# Patient Record
Sex: FEMALE | Race: WHITE | NOT HISPANIC OR LATINO | Employment: FULL TIME | URBAN - METROPOLITAN AREA
[De-identification: names, ages, dates, MRNs, and addresses within clinical notes are randomized per-mention and may not be internally consistent; named-entity substitution may affect disease eponyms.]

---

## 2017-01-03 ENCOUNTER — OFFICE VISIT (OUTPATIENT)
Dept: OBSTETRICS AND GYNECOLOGY | Facility: CLINIC | Age: 22
End: 2017-01-03

## 2017-01-03 VITALS
HEIGHT: 65 IN | DIASTOLIC BLOOD PRESSURE: 70 MMHG | SYSTOLIC BLOOD PRESSURE: 110 MMHG | WEIGHT: 184 LBS | BODY MASS INDEX: 30.66 KG/M2

## 2017-01-03 DIAGNOSIS — N76.0 BACTERIAL VAGINAL INFECTION: ICD-10-CM

## 2017-01-03 DIAGNOSIS — N89.8 VAGINAL DISCHARGE: Primary | ICD-10-CM

## 2017-01-03 DIAGNOSIS — B96.89 BACTERIAL VAGINAL INFECTION: ICD-10-CM

## 2017-01-03 DIAGNOSIS — B37.9 CANDIDIASIS: ICD-10-CM

## 2017-01-03 LAB — WET PREP GENITAL: NORMAL

## 2017-01-03 PROCEDURE — 99213 OFFICE O/P EST LOW 20 MIN: CPT | Performed by: OBSTETRICS & GYNECOLOGY

## 2017-01-03 PROCEDURE — 87210 SMEAR WET MOUNT SALINE/INK: CPT | Performed by: OBSTETRICS & GYNECOLOGY

## 2017-01-03 RX ORDER — METRONIDAZOLE 500 MG/1
500 TABLET ORAL 2 TIMES DAILY
Qty: 14 TABLET | Refills: 1 | OUTPATIENT
Start: 2017-01-03 | End: 2020-01-17

## 2017-01-03 RX ORDER — NORGESTIMATE AND ETHINYL ESTRADIOL 7DAYSX3 28
KIT ORAL
COMMUNITY
Start: 2016-12-07 | End: 2017-01-08 | Stop reason: SDUPTHER

## 2017-01-03 NOTE — PROGRESS NOTES
Subjective   Kelly Stevens is a 21 y.o. female.     History of Present Illness    Patient is having pain. Pt had wet mount in Dec , negative. UA in Dec was negative. No improvement in her symptoms  The following portions of the patient's history were reviewed and updated as appropriate: allergies, current medications, past family history, past medical history, past social history, past surgical history and problem list.    Review of Systems   Constitutional: Negative.    Genitourinary: Positive for dyspareunia, vaginal discharge and vaginal pain. Negative for decreased urine volume, difficulty urinating, dysuria, enuresis, flank pain, frequency, genital sores, hematuria, menstrual problem, pelvic pain, urgency and vaginal bleeding.   Psychiatric/Behavioral: Negative.        Objective   Physical Exam   Genitourinary: Pelvic exam was performed with patient supine. No labial fusion. There is rash on the right labia. There is no tenderness or lesion on the right labia. There is rash on the left labia. There is no tenderness, lesion or injury on the left labia. Uterus is not deviated, not enlarged, not fixed and not tender. Cervix exhibits no motion tenderness, no discharge and no friability. Right adnexum displays no mass, no tenderness and no fullness. Left adnexum displays no mass, no tenderness and no fullness. No erythema, tenderness or bleeding in the vagina. No foreign body in the vagina. No signs of injury around the vagina. Vaginal discharge found.           Assessment/Plan   Kelly was seen today for follow-up.    Diagnoses and all orders for this visit:    Vaginal discharge  -     POC Wet Prep    Candidiasis  -     terconazole (TERAZOL 7) 0.4 % vaginal cream; Insert 1 applicator into the vagina Every Night.    Bacterial vaginal infection  -     metroNIDAZOLE (FLAGYL) 500 MG tablet; Take 1 tablet by mouth 2 (Two) Times a Day.            Return 3 weeks    Kenroy Gustafson MD

## 2017-01-03 NOTE — MR AVS SNAPSHOT
Kelly Jacobsjasmin   1/3/2017 8:50 AM   Office Visit    Dept Phone:  [phone]   Encounter #:  [id number]    Provider:  Kenroy Gustafson MD   Department:  [facility]OLOG[facility]                Your Full Care Plan              Today's Medication Changes          These changes are accurate as of: 1/3/17 10:17 AM.  If you have any questions, ask your nurse or doctor.               New Medication(s)Ordered:     metroNIDAZOLE 500 MG tablet   Commonly known as:  FLAGYL   Take 1 tablet by mouth 2 (Two) Times a Day.       terconazole 0.4 % vaginal cream   Commonly known as:  TERAZOL 7   Insert 1 applicator into the vagina Every Night.            Where to Get Your Medications      These medications were sent to [facility] [address] [place] [address] - [address] [facility] [address] [facility] [address] - [phone]  - [phone]   [address] [facility] [address]     Phone:  [phone]     metroNIDAZOLE 500 MG tablet    terconazole 0.4 % vaginal cream                  Your Updated Medication List          This list is accurate as of: 1/3/17 10:17 AM.  Always use your most recent med list.                cetirizine 10 MG tablet   Commonly known as:  zyrTEC       hydrOXYzine 50 MG tablet   Commonly known as:  ATARAX       metroNIDAZOLE 500 MG tablet   Commonly known as:  FLAGYL   Take 1 tablet by mouth 2 (Two) Times a Day.       mometasone-formoterol 200-5 MCG/ACT inhaler   Commonly known as:  DULERA 200       montelukast 10 MG tablet   Commonly known as:  SINGULAIR       * ORTHO TRI-CYCLEN (28) PO       * TRI-SPRINTEC 0.18/0.215/0.25 MG-35 MCG per tablet   Generic drug:  norgestimate-ethinyl estradiol       terconazole 0.4 % vaginal cream   Commonly known as:  TERAZOL 7   Insert 1 applicator into the vagina Every Night.       * Notice:  This list has 2 medication(s) that are the same as other medications prescribed for you. Read the directions  "carefully, and ask your doctor or other care provider to review them with you.            We Performed the Following     POC Wet Prep       You Were Diagnosed With        Codes Comments    Vaginal discharge    -  Primary ICD-10-CM: N89.8  ICD-9-CM: 623.5     Candidiasis     ICD-10-CM: B37.9  ICD-9-CM: 112.9     Bacterial vaginal infection     ICD-10-CM: N76.0, B96.89  ICD-9-CM: 616.10, 041.9       Instructions     None    Patient Instructions History      Upcoming Appointments     Visit Type Date Time Department    GYN FOLLOW UP 1/3/2017  8:50 AM [facility] [place]      [employer] Signup     [facility] [employer] allows you to send messages to your doctor, view your test results, renew your prescriptions, schedule appointments, and more. To sign up, go to [employer] and click on the Sign Up Now link in the New User? box. Enter your [employer] Activation Code exactly as it appears below along with the last four digits of your Social Security Number and your Date of Birth () to complete the sign-up process. If you do not sign up before the expiration date, you must request a new code.    [employer] Activation Code: [email or web address]  Expires: 2017 10:17 AM    If you have questions, you can email [email or web address] or call [phone] to talk to our [employer] staff. Remember, [employer] is NOT to be used for urgent needs. For medical emergencies, dial 911.               Other Info from Your Visit           Allergies     Sulfa Antibiotics        Reason for Visit     Follow-up Pt, is here for pelvic pain.  Pt also has vaginal burning.  She states it's been going on for about 2 months.       Vital Signs     Blood Pressure Height Weight Last Menstrual Period Body Mass Index Smoking Status    110/70 (BP Location: Right arm, Patient Position: Sitting, Cuff Size: Adult) 65\" (165.1 cm) 184 lb (83.5 kg) 2016 (Exact Date) 30.62 kg/m2 Never Smoker      Problems and Diagnoses Noted     Discharge from " the vagina    -  Primary    Candida infection        Bacterial vaginal infection          Results     POC Wet Prep      Component    Wet Prep    yeast and clue cells

## 2017-01-09 RX ORDER — NORGESTIMATE AND ETHINYL ESTRADIOL 7DAYSX3 28
KIT ORAL
Qty: 28 TABLET | Refills: 11 | OUTPATIENT
Start: 2017-01-09 | End: 2020-01-17

## 2017-03-02 ENCOUNTER — OFFICE VISIT (OUTPATIENT)
Dept: OBSTETRICS AND GYNECOLOGY | Facility: CLINIC | Age: 22
End: 2017-03-02

## 2017-03-02 VITALS — DIASTOLIC BLOOD PRESSURE: 84 MMHG | SYSTOLIC BLOOD PRESSURE: 128 MMHG | WEIGHT: 176 LBS | BODY MASS INDEX: 29.29 KG/M2

## 2017-03-02 DIAGNOSIS — N92.1 BREAKTHROUGH BLEEDING ON BIRTH CONTROL PILLS: Primary | ICD-10-CM

## 2017-03-02 PROCEDURE — 99213 OFFICE O/P EST LOW 20 MIN: CPT | Performed by: OBSTETRICS & GYNECOLOGY

## 2017-03-02 RX ORDER — BACLOFEN 10 MG/1
TABLET ORAL
COMMUNITY
Start: 2017-02-21 | End: 2020-01-17

## 2017-03-02 RX ORDER — CHLORHEXIDINE GLUCONATE 0.12 MG/ML
RINSE ORAL
Refills: 1 | COMMUNITY
Start: 2017-02-17 | End: 2020-01-17

## 2017-03-02 RX ORDER — HYDROXYZINE HYDROCHLORIDE 25 MG/1
TABLET, FILM COATED ORAL
COMMUNITY
Start: 2017-02-05 | End: 2021-05-17

## 2017-03-02 RX ORDER — CETIRIZINE HYDROCHLORIDE 10 MG/1
10 TABLET ORAL
COMMUNITY
End: 2020-01-17

## 2017-03-02 RX ORDER — MONTELUKAST SODIUM 10 MG/1
10 TABLET ORAL
COMMUNITY
End: 2020-01-17

## 2017-03-02 RX ORDER — GLYCOPYRROLATE 2 MG/1
TABLET ORAL
COMMUNITY
Start: 2017-02-27 | End: 2020-01-17

## 2017-03-02 RX ORDER — BUPROPION HYDROCHLORIDE 150 MG/1
TABLET ORAL
COMMUNITY
Start: 2017-02-21 | End: 2020-01-17

## 2017-03-02 NOTE — PROGRESS NOTES
Subjective   Kelly Stevens is a 21 y.o. female.     History of Present Illness    Break through bleeding on BCP. Pt has been on this pill for 15 months  The following portions of the patient's history were reviewed and updated as appropriate: allergies, current medications, past family history, past medical history, past social history, past surgical history and problem list.    Review of Systems   Constitutional: Negative.    Respiratory: Negative.    Cardiovascular: Negative.    Gastrointestinal: Negative.    Genitourinary: Positive for menstrual problem, vaginal bleeding and vaginal discharge. Negative for decreased urine volume, difficulty urinating, dyspareunia, dysuria, enuresis, flank pain, frequency, genital sores, hematuria, pelvic pain, urgency and vaginal pain.   Psychiatric/Behavioral: Negative.        Objective   Physical Exam   Constitutional: She appears well-nourished.   Abdominal: Soft. Bowel sounds are normal. She exhibits no distension and no mass. There is no tenderness. There is no rebound and no guarding. No hernia.   Genitourinary: Pelvic exam was performed with patient supine. No labial fusion. There is no rash, tenderness, lesion or injury on the right labia. There is no rash, tenderness, lesion or injury on the left labia. Uterus is not deviated, not enlarged, not fixed and not tender. Cervix exhibits discharge. Cervix exhibits no motion tenderness and no friability. Right adnexum displays no mass, no tenderness and no fullness. Left adnexum displays no mass, no tenderness and no fullness. There is bleeding in the vagina. No erythema or tenderness in the vagina. No foreign body in the vagina. No signs of injury around the vagina. No vaginal discharge found.   Nursing note and vitals reviewed.      Assessment/Plan   Kelly was seen today for gynecologic exam.    Diagnoses and all orders for this visit:    Breakthrough bleeding on birth control pills    Other orders  -      norgestrel-ethinyl estradiol (LOW-OGESTREL,CRYSELLE) 0.3-30 MG-MCG per tablet; Take 1 tablet by mouth Daily.           Change to monophasic BCP  Return 3 months    Kenroy Gustafson MD

## 2018-07-09 RX ORDER — NORGESTREL AND ETHINYL ESTRADIOL 0.3-0.03MG
KIT ORAL
Qty: 28 TABLET | Refills: 0 | OUTPATIENT
Start: 2018-07-09 | End: 2020-01-17

## 2021-05-17 ENCOUNTER — OFFICE VISIT (OUTPATIENT)
Dept: OBSTETRICS AND GYNECOLOGY | Facility: CLINIC | Age: 26
End: 2021-05-17

## 2021-05-17 VITALS
HEIGHT: 63 IN | DIASTOLIC BLOOD PRESSURE: 80 MMHG | RESPIRATION RATE: 14 BRPM | WEIGHT: 203.4 LBS | SYSTOLIC BLOOD PRESSURE: 106 MMHG | BODY MASS INDEX: 36.04 KG/M2

## 2021-05-17 DIAGNOSIS — Z01.419 WELL WOMAN EXAM WITH ROUTINE GYNECOLOGICAL EXAM: Primary | ICD-10-CM

## 2021-05-17 DIAGNOSIS — N94.6 DYSMENORRHEA: ICD-10-CM

## 2021-05-17 DIAGNOSIS — Z86.19 HISTORY OF MONONUCLEOSIS: ICD-10-CM

## 2021-05-17 PROCEDURE — 99385 PREV VISIT NEW AGE 18-39: CPT | Performed by: OBSTETRICS & GYNECOLOGY

## 2021-05-17 NOTE — PROGRESS NOTES
Subjective   Chief Complaint   Patient presents with   • Establish Care     Former patient.    • Menstrual Problem     Patient complains of painful periods     Kelly Stevens is a 25 y.o. year old  presenting to be seen for her annual exam.  Patient presents today for an annual checkup.  She is having painful periods and wants to resume birth control pills which did improve her cycles.  Patient is not sexually active at the present time.  In the last 3 months or so the patient developed mono.  She has had some neurological symptoms of confusion and dizziness.  Her primary care has referred her to neurology for work-up.  Patient is in the process of obtaining a neurologist.  She will complete her evaluation and call to resume birth control pills when she is cleared.  Patient took Nordette in 2017 and did well on this birth control pill  Adult Visits - 19 to 39 Years - Counseling/Anticipatory Guidance: Nutrition, family planning/contraception, physical activity, healthy weight, injury prevention, misuse of tobacco, alcohol and drugs, sexual behavior and STDs, dental health, mental health, immunizations, screenings For Women: Breast cancer and self breast exams    SEXUAL Hx:  She is not currently sexually active.  In the past year there has not been new sexual partners.    Condoms are not typically used.  She would not like to be screened for STD's at today's exam.  Current birth control method: abstinence.  She is happy with her current method of contraception and does not want to discuss alternative methods of contraception.  MENSTRUAL Hx:  Patient's last menstrual period was 05/10/2021 (approximate).  In the past 6 months her cycles have been regular, predictable and occur monthly.  Her menstrual flow is typically normal.   Each month on average there are roughly 2 day(s) of very heavy flow.    Intermenstrual bleeding is absent.    Post-coital bleeding is absent.  Dysmenorrhea: severe  PMS: is not  "affecting her activities of daily living  Her cycles ARE a source of concern for her that she wishes to discuss today.  HEALTH Hx:  She exercises regularly:yes.  She wears her seat belt:yes.  She has concerns about domestic violence: no.  OTHER COMPLAINTS:  Nothing else    The following portions of the patient's history were reviewed and updated as appropriate:problem list, current medications, allergies, past family history, past medical history, past social history and past surgical history.    Social History    Tobacco Use      Smoking status: Never Smoker      Smokeless tobacco: Never Used    Review of Systems  Review of Systems - History obtained from the patient  General ROS: negative  Psychological ROS: negative  ENT ROS: negative  Allergy and Immunology ROS: negative  Hematological and Lymphatic ROS: negative  Endocrine ROS: negative  Breast ROS: negative for breast lumps  Respiratory ROS: no cough, shortness of breath, or wheezing  Cardiovascular ROS: no chest pain or dyspnea on exertion  Gastrointestinal ROS: no abdominal pain, change in bowel habits, or black or bloody stools  Genito-Urinary ROS: no dysuria, trouble voiding, or hematuria  Musculoskeletal ROS: negative  Neurological ROS: positive for - confusion and dizziness  Dermatological ROS: negative        Objective   /80 (BP Location: Right arm, Patient Position: Sitting, Cuff Size: Adult)   Resp 14   Ht 160 cm (63\")   Wt 92.3 kg (203 lb 6.4 oz)   LMP 05/10/2021 (Approximate)   Breastfeeding No   BMI 36.03 kg/m²     General:  well developed; well nourished  no acute distress   Skin:  No suspicious lesions seen   Thyroid: not examined   Breasts:  Examined in supine position  Symmetric without masses or skin dimpling  Nipples normal without inversion, lesions or discharge  There are no palpable axillary nodes   Abdomen: soft, non-tender; no masses  no umbilical or inguinal hernias are present  no hepato-splenomegaly   Heart: regular rate " and rhythm, S1, S2 normal, no murmur, click, rub or gallop   Lungs: clear to auscultation   Pelvis: Clinical staff was present for exam  External genitalia:  normal appearance of the external genitalia including Bartholin's and [place]'s glands.  :  urethral meatus normal;  Vaginal:  normal pink mucosa without prolapse or lesions.  Cervix:  normal appearance. Pap smear was done  Uterus:  normal size, shape and consistency. anteverted;  Adnexa:  normal bimanual exam of the adnexa.  Rectal:  digital rectal exam not performed; anus visually normal appearing.          Assessment/Plan   Diagnoses and all orders for this visit:    1. Well woman exam with routine gynecological exam (Primary)  -     Pap IG, Rfx HPV ASCU; Future    2. Dysmenorrhea    3. History of mononucleosis         Call and a prescription for Nordette will be sent to her pharmacy  Return in 1 year  This note was electronically signed.    Kenroy Gustafson MD   May 17, 2021

## 2021-05-19 DIAGNOSIS — Z01.419 WELL WOMAN EXAM WITH ROUTINE GYNECOLOGICAL EXAM: ICD-10-CM

## 2023-05-18 ENCOUNTER — TELEPHONE (OUTPATIENT)
Dept: FAMILY MEDICINE CLINIC | Facility: CLINIC | Age: 28
End: 2023-05-18

## 2023-05-18 NOTE — TELEPHONE ENCOUNTER
Left message I believe pt has wrong office, she is seeing Dr. Benitez.  We did not refer her to neurology we have not see this pt since we went [facility]

## 2023-05-18 NOTE — TELEPHONE ENCOUNTER
Caller: Kelly Stevens    Relationship: Self    Best call back number: [phone]    What specialty or service is being requested: NEUROLOGY     What is the provider, practice or medical service name: DR. LUNDBERG     What is the office location: [place]     What is the office phone number: [phone]    Any additional details: PATIENT HAS PREVIOUSLY BEEN REFERRED TO NEUROLOGY BUT IS ASKING TO GET REFERRED TO THIS NEUROLOGIST

## 2023-08-04 ENCOUNTER — INITIAL PRENATAL (OUTPATIENT)
Dept: OBSTETRICS AND GYNECOLOGY | Facility: CLINIC | Age: 28
End: 2023-08-04
Payer: MEDICAID

## 2023-08-04 VITALS — WEIGHT: 142.8 LBS | BODY MASS INDEX: 25.3 KG/M2 | DIASTOLIC BLOOD PRESSURE: 64 MMHG | SYSTOLIC BLOOD PRESSURE: 116 MMHG

## 2023-08-04 DIAGNOSIS — Z34.91 INITIAL OBSTETRIC VISIT IN FIRST TRIMESTER: Primary | ICD-10-CM

## 2023-08-04 LAB
B-HCG UR QL: POSITIVE
EXPIRATION DATE: ABNORMAL
GLUCOSE UR STRIP-MCNC: NEGATIVE MG/DL
INTERNAL NEGATIVE CONTROL: ABNORMAL
INTERNAL POSITIVE CONTROL: ABNORMAL
Lab: ABNORMAL
PROT UR STRIP-MCNC: NEGATIVE MG/DL

## 2023-08-04 RX ORDER — BUDESONIDE AND FORMOTEROL FUMARATE DIHYDRATE 80; 4.5 UG/1; UG/1
2 AEROSOL RESPIRATORY (INHALATION)
COMMUNITY

## 2023-08-05 LAB
ABO GROUP BLD: ABNORMAL
AMPHETAMINES UR QL SCN: NEGATIVE NG/ML
BARBITURATES UR QL SCN: NEGATIVE NG/ML
BASOPHILS # BLD AUTO: 0.1 X10E3/UL (ref 0–0.2)
BASOPHILS NFR BLD AUTO: 1 %
BENZODIAZ UR QL: NEGATIVE NG/ML
BLD GP AB SCN SERPL QL: NEGATIVE
BZE UR QL: NEGATIVE NG/ML
CANNABINOIDS UR QL SCN: NEGATIVE NG/ML
EOSINOPHIL # BLD AUTO: 0.6 X10E3/UL (ref 0–0.4)
EOSINOPHIL NFR BLD AUTO: 8 %
ERYTHROCYTE [DISTWIDTH] IN BLOOD BY AUTOMATED COUNT: 13 % (ref 11.7–15.4)
HBV SURFACE AG SERPL QL IA: NEGATIVE
HCT VFR BLD AUTO: 37 % (ref 34–46.6)
HCV IGG SERPL QL IA: NON REACTIVE
HGB BLD-MCNC: 13.1 G/DL (ref 11.1–15.9)
HIV 1+2 AB+HIV1 P24 AG SERPL QL IA: NON REACTIVE
IMM GRANULOCYTES # BLD AUTO: 0 X10E3/UL (ref 0–0.1)
IMM GRANULOCYTES NFR BLD AUTO: 0 %
LYMPHOCYTES # BLD AUTO: 1.3 X10E3/UL (ref 0.7–3.1)
LYMPHOCYTES NFR BLD AUTO: 17 %
MCH RBC QN AUTO: 29.3 PG (ref 26.6–33)
MCHC RBC AUTO-ENTMCNC: 35.4 G/DL (ref 31.5–35.7)
MCV RBC AUTO: 83 FL (ref 79–97)
METHADONE UR QL SCN: NEGATIVE NG/ML
MONOCYTES # BLD AUTO: 0.4 X10E3/UL (ref 0.1–0.9)
MONOCYTES NFR BLD AUTO: 5 %
NEUTROPHILS # BLD AUTO: 5.4 X10E3/UL (ref 1.4–7)
NEUTROPHILS NFR BLD AUTO: 69 %
OPIATES UR QL: NEGATIVE NG/ML
PCP UR QL: NEGATIVE NG/ML
PLATELET # BLD AUTO: 283 X10E3/UL (ref 150–450)
PROPOXYPH UR QL SCN: NEGATIVE NG/ML
RBC # BLD AUTO: 4.47 X10E6/UL (ref 3.77–5.28)
RH BLD: POSITIVE
RPR SER QL: NON REACTIVE
RUBV IGG SERPL IA-ACNC: 4.61 INDEX
WBC # BLD AUTO: 7.8 X10E3/UL (ref 3.4–10.8)

## 2023-08-06 LAB
BACTERIA UR CULT: NORMAL
BACTERIA UR CULT: NORMAL

## 2023-08-09 ENCOUNTER — TELEPHONE (OUTPATIENT)
Dept: OBSTETRICS AND GYNECOLOGY | Facility: CLINIC | Age: 28
End: 2023-08-09
Payer: MEDICAID

## 2023-08-09 LAB
C TRACH RRNA CVX QL NAA+PROBE: NEGATIVE
CYTOLOGIST CVX/VAG CYTO: ABNORMAL
CYTOLOGY CVX/VAG DOC CYTO: ABNORMAL
CYTOLOGY CVX/VAG DOC THIN PREP: ABNORMAL
DX ICD CODE: ABNORMAL
HIV 1 & 2 AB SER-IMP: ABNORMAL
HPV I/H RISK 4 DNA CVX QL PROBE+SIG AMP: POSITIVE
N GONORRHOEA RRNA CVX QL NAA+PROBE: NEGATIVE
OTHER STN SPEC: ABNORMAL
STAT OF ADQ CVX/VAG CYTO-IMP: ABNORMAL
T VAGINALIS RRNA SPEC QL NAA+PROBE: NEGATIVE

## 2023-08-09 NOTE — TELEPHONE ENCOUNTER
Pt called stating she had a missed call. It looks like you tried to contact her today and left a vm for her to call back.I placed pt on hold and went to go find you but when I returned pt had hung up.     Keri Molina

## 2023-08-09 NOTE — TELEPHONE ENCOUNTER
----- Message from Kenroy Aguilar MD sent at 8/9/2023 11:33 AM EDT -----  Please let the patient know that her Pap is normal, but HPV testing was positive.  This will not affect the pregnancy or the baby.  I recommend a repeat Pap 6 weeks postpartum.  Thank you.

## 2023-08-10 ENCOUNTER — TELEPHONE (OUTPATIENT)
Dept: OBSTETRICS AND GYNECOLOGY | Facility: CLINIC | Age: 28
End: 2023-08-10
Payer: MEDICAID

## 2023-09-08 ENCOUNTER — ROUTINE PRENATAL (OUTPATIENT)
Dept: OBSTETRICS AND GYNECOLOGY | Facility: CLINIC | Age: 28
End: 2023-09-08
Payer: MEDICAID

## 2023-09-08 VITALS — BODY MASS INDEX: 26.89 KG/M2 | SYSTOLIC BLOOD PRESSURE: 124 MMHG | DIASTOLIC BLOOD PRESSURE: 70 MMHG | WEIGHT: 151.8 LBS

## 2023-09-08 DIAGNOSIS — Z34.82 PRENATAL CARE, SUBSEQUENT PREGNANCY IN SECOND TRIMESTER: Primary | ICD-10-CM

## 2023-09-08 LAB
GLUCOSE UR STRIP-MCNC: NEGATIVE MG/DL
PROT UR STRIP-MCNC: NEGATIVE MG/DL

## 2023-09-08 RX ORDER — PRENATAL VIT/IRON FUM/FOLIC AC 27MG-0.8MG
1 TABLET ORAL DAILY
COMMUNITY

## 2023-09-08 NOTE — PROGRESS NOTES
CC: Obstetric visit    HPI: 27-year-old  2 para 1 at 15-0/7 weeks by first trimester ultrasound presents for her obstetric visit.  Her nausea has resolved.  Overall, she is feeling well.  She has mild fatigue.    Review of systems    This is negative for nausea and vomiting.  Negative for abdominal or pelvic pain.  Negative for vaginal bleeding.    Physical examination    The abdomen is soft and gravid.  There is no epigastric tenderness.  No fundal tenderness.  No suprapubic tenderness.  Extremities are equal in size    Assessment and plan    1.  Intrauterine pregnancy at 15-0/7 weeks by first trimester ultrasound.  Counseled regarding the patient's due date.  She reports that her last menstrual cycle was approximate.  For this reason, we will use the ultrasound for gestational dating.  The patient agrees with this recommendation.  2.  Counseled regarding genetic screening.  The patient would like to proceed with noninvasive prenatal screening today.  This has been ordered.

## 2023-09-10 LAB
AFP INTERP SERPL-IMP: NORMAL
AFP INTERP SERPL-IMP: NORMAL
AFP MOM SERPL: 0.88
AFP SERPL-MCNC: 26.5 NG/ML
AGE AT DELIVERY: 29.1 YR
GA METHOD: NORMAL
GA: 15 WEEKS
IDDM PATIENT QL: NO
LABORATORY COMMENT REPORT: NORMAL
MULTIPLE PREGNANCY: NO
NEURAL TUBE DEFECT RISK FETUS: NORMAL %
RESULT: NORMAL

## 2023-09-14 LAB
CFTR MUT ANL BLD/T: NORMAL
LABORATORY COMMENT REPORT: NORMAL

## 2023-09-16 LAB
CFDNA.FET/CFDNA.TOTAL SFR FETUS: NORMAL %
CITATION REF LAB TEST: NORMAL
FET 13+18+21+X+Y ANEUP PLAS.CFDNA: NEGATIVE
FET CHR 21 TS PLAS.CFDNA QL: NEGATIVE
FET SEX PLAS.CFDNA DOSAGE CFDNA: NORMAL
FET TS 13 RISK PLAS.CFDNA QL: NEGATIVE
FET TS 18 RISK WBC.DNA+CFDNA QL: NEGATIVE
GA EST FROM CONCEPTION DATE: NORMAL D
GESTATIONAL AGE > 9:: YES
LAB DIRECTOR NAME PROVIDER: NORMAL
LAB DIRECTOR NAME PROVIDER: NORMAL
LABORATORY COMMENT REPORT: NORMAL
LIMITATIONS OF THE TEST: NORMAL
NEGATIVE PREDICTIVE VALUE: NORMAL
NOTE: NORMAL
PERFORMANCE CHARACTERISTICS: NORMAL
POSITIVE PREDICTIVE VALUE: NORMAL
REF LAB TEST METHOD: NORMAL
TEST PERFORMANCE INFO SPEC: NORMAL

## 2023-10-06 ENCOUNTER — ROUTINE PRENATAL (OUTPATIENT)
Dept: OBSTETRICS AND GYNECOLOGY | Facility: CLINIC | Age: 28
End: 2023-10-06
Payer: MEDICAID

## 2023-10-06 VITALS — BODY MASS INDEX: 27.67 KG/M2 | DIASTOLIC BLOOD PRESSURE: 64 MMHG | WEIGHT: 156.2 LBS | SYSTOLIC BLOOD PRESSURE: 116 MMHG

## 2023-10-06 DIAGNOSIS — Z3A.19 19 WEEKS GESTATION OF PREGNANCY: Primary | ICD-10-CM

## 2023-10-06 NOTE — PROGRESS NOTES
CC: Obstetric visit    HPI: 27-year-old  2 para 1 presents at 19-0/7 weeks for her obstetric visit.  She has begun to appreciate fetal movement.  She has no complaints today.    Review of systems    This is negative for nausea or vomiting.  Negative for fever or chills.  Negative for vaginal bleeding.    Physical examination    The abdomen is soft and gravid.  There is no epigastric tenderness.  No fundal tenderness.  No suprapubic tenderness.  Extremities are equal in size    Assessment and plan    1.  Intrauterine pregnancy at 19-0/7 weeks  2.  AFP testing was negative.  Counseled.  3.  Screening ultrasound in the next several weeks.

## 2023-11-03 ENCOUNTER — ROUTINE PRENATAL (OUTPATIENT)
Dept: OBSTETRICS AND GYNECOLOGY | Facility: CLINIC | Age: 28
End: 2023-11-03
Payer: MEDICAID

## 2023-11-03 VITALS — SYSTOLIC BLOOD PRESSURE: 118 MMHG | DIASTOLIC BLOOD PRESSURE: 68 MMHG | WEIGHT: 165.4 LBS | BODY MASS INDEX: 29.3 KG/M2

## 2023-11-03 DIAGNOSIS — Z3A.23 23 WEEKS GESTATION OF PREGNANCY: Primary | ICD-10-CM

## 2023-11-03 LAB
GLUCOSE UR STRIP-MCNC: NEGATIVE MG/DL
PROT UR STRIP-MCNC: NEGATIVE MG/DL

## 2023-11-03 NOTE — PROGRESS NOTES
CC: Obstetric visit    HPI: 27-year-old  2 para 1 presents at 23-0/7 weeks for her obstetric visit.  She had her screening ultrasound recently.  It was a normal screening.  Her baby is moving actively.  She had some vaginal discharge which she treated with Monistat.  The discharge is improved, but the patient describes a persistent vaginal ache.    Review of systems    This is positive for discharge, now resolved.  Positive for pain.  Negative for fever or chills.  Negative for nausea or vomiting.    Physical examination    The abdomen is soft and gravid.  There is no epigastric tenderness.  No fundal tenderness.  No suprapubic tenderness.  Extremities are equal in size  Pelvic examination reveals normal female external genitalia.  There is slight inflammation around the labia minora bilaterally.  Discharge appears physiologic.  Cultures performed.  The cervix is closed, thick and posterior.    Assessment and plan    1.  Intrauterine pregnancy at 23-0/7 weeks  2.  Screening ultrasound was reviewed and discussed with the patient.  3.  Counseled regarding ACOG recommendations for the influenza vaccine in pregnancy.  The patient would like to proceed, but not today.  She will make arrangements to do this on another day.  4.  Vaginal discharge with vaginal discomfort.  Counseled.  Physical examination is normal except for some slight.  Irritation of the labia minora.  Cultures performed.  Further recommendations pending the results of these cultures.

## 2023-11-07 ENCOUNTER — TELEPHONE (OUTPATIENT)
Dept: OBSTETRICS AND GYNECOLOGY | Facility: CLINIC | Age: 28
End: 2023-11-07
Payer: MEDICAID

## 2023-11-07 NOTE — TELEPHONE ENCOUNTER
If the patient is feeling shortness of breath or very high fevers, she should come to the emergency room, as hospital admission could be required.  If her symptoms are mostly upper respiratory symptoms, we recommend bed rest, Tylenol for fever control and plenty of fluids.  Paxlovid can be given, but is not given as often in pregnancy.

## 2023-11-07 NOTE — TELEPHONE ENCOUNTER
Provider: DR HERNANDEZ    Caller: JODY CUI    Relationship to Patient: SELF    [facility]    Phone Number: [phone]    Reason for Call: PT WENT TO URGENT CARE LAST NIGHT AND WAS DIAGNOSED WITH COVID URGENT CARE RECOMMENDED THAT SHE CONTACT HER OBGYN TO SEE HOW THEY WANTED TO TREAT HER DUE TO HER BEING PREGNANT -PLEASE CALL PT TO ADVISE

## 2023-11-07 NOTE — TELEPHONE ENCOUNTER
Spoke with Kelly and read Dr Aguilar's recommendations to her. Included antihistamines, Mucinex plain and Robitussin plain. If worsens, please let us know.

## 2023-11-15 ENCOUNTER — REFERRAL TRIAGE (OUTPATIENT)
Dept: LABOR AND DELIVERY | Facility: HOSPITAL | Age: 28
End: 2023-11-15
Payer: MEDICAID

## 2023-11-27 ENCOUNTER — PATIENT OUTREACH (OUTPATIENT)
Dept: LABOR AND DELIVERY | Facility: HOSPITAL | Age: 28
End: 2023-11-27
Payer: MEDICAID

## 2023-11-27 NOTE — OUTREACH NOTE
[facility]  Unable to Reach       Questions/Answers      Flowsheet Row Responses   Pending Outreach Confirm Patient Interest   Call Attempt First   Outcome Left message            Left vm, encouraged to reach out with any needs. Will call again at a later date.      Sarwat Franklin RN  Maternity Nurse Navigator    11/27/2023, 13:39 EST

## 2023-11-29 ENCOUNTER — PATIENT OUTREACH (OUTPATIENT)
Dept: LABOR AND DELIVERY | Facility: HOSPITAL | Age: 28
End: 2023-11-29
Payer: MEDICAID

## 2023-11-29 NOTE — OUTREACH NOTE
[facility]  Unable to Reach       Questions/Answers      Flowsheet Row Responses   Pending Outreach Confirm Patient Interest   Call Attempt Second   Outcome Left message            Declined program for pt d/t UTR: 2 voicemails and 1 mychart msg sent. Pt has my contact info and encouraged her to reach out with any needs.      Sarwat Franklin RN  Maternity Nurse Navigator    11/29/2023, 12:48 EST

## 2023-12-01 ENCOUNTER — ROUTINE PRENATAL (OUTPATIENT)
Dept: OBSTETRICS AND GYNECOLOGY | Facility: CLINIC | Age: 28
End: 2023-12-01
Payer: MEDICAID

## 2023-12-01 VITALS — BODY MASS INDEX: 29.9 KG/M2 | SYSTOLIC BLOOD PRESSURE: 110 MMHG | WEIGHT: 168.8 LBS | DIASTOLIC BLOOD PRESSURE: 64 MMHG

## 2023-12-01 DIAGNOSIS — Z34.82 PRENATAL CARE, SUBSEQUENT PREGNANCY IN SECOND TRIMESTER: Primary | ICD-10-CM

## 2023-12-01 DIAGNOSIS — Z3A.23 23 WEEKS GESTATION OF PREGNANCY: ICD-10-CM

## 2023-12-01 LAB
GLUCOSE UR STRIP-MCNC: NEGATIVE MG/DL
PROT UR STRIP-MCNC: NEGATIVE MG/DL

## 2023-12-01 NOTE — PROGRESS NOTES
CC: Obstetric visit    HPI: 27-year-old  2 para 1 presents at 27-0/7 weeks for her obstetric visit.  Her baby is moving actively.  She had COVID last month, but has recovered almost completely.  She is feeling well.    Review of systems    This is positive for cough.  It is negative for fever or chills.  Negative for nausea or vomiting.    Physical examination    The abdomen is soft and gravid.  There is no epigastric tenderness.  No fundal tenderness.  No suprapubic tenderness.  Extremities are equal in size    Assessment and plan    1.  Intrauterine pregnancy at 27-0/7 weeks  2.  1 hour glucose tolerance test today.  3.  Recent COVID.  The patient is recovering well.

## 2023-12-02 LAB
BLD GP AB SCN SERPL QL: NEGATIVE
GLUCOSE 1H P 50 G GLC PO SERPL-MCNC: 89 MG/DL (ref 65–139)
HCT VFR BLD AUTO: 34.5 % (ref 34–46.6)
HGB BLD-MCNC: 11.9 G/DL (ref 12–15.9)

## 2023-12-22 ENCOUNTER — ROUTINE PRENATAL (OUTPATIENT)
Dept: OBSTETRICS AND GYNECOLOGY | Facility: CLINIC | Age: 28
End: 2023-12-22
Payer: MEDICAID

## 2023-12-22 VITALS — BODY MASS INDEX: 30.19 KG/M2 | SYSTOLIC BLOOD PRESSURE: 114 MMHG | DIASTOLIC BLOOD PRESSURE: 62 MMHG | WEIGHT: 170.4 LBS

## 2023-12-22 DIAGNOSIS — Z3A.30 30 WEEKS GESTATION OF PREGNANCY: Primary | ICD-10-CM

## 2023-12-22 RX ORDER — FERROUS SULFATE 325(65) MG
325 TABLET ORAL
Qty: 30 TABLET | Refills: 11 | Status: SHIPPED | OUTPATIENT
Start: 2023-12-22

## 2023-12-22 NOTE — PROGRESS NOTES
CC: Obstetric visit    HPI: 27-year-old  2 para 1 presents at 30-0/7 weeks for her obstetric visit.  Her baby is moving actively.  She complains of some fatigue.    Review of systems    This is negative for fever or chills.  Negative for nausea or vomiting.  Negative for vaginal bleeding.    Physical examination    The abdomen is soft and gravid.  There is no epigastric tenderness.  No fundal tenderness.  No CVA tenderness.  No suprapubic tenderness.  Extremities are equal in size    Assessment and plan    1.  Intrauterine pregnancy at 30-0/7 weeks  2.  Counseled regarding American Academy of pediatrics recommendations for the RSV vaccine between 32 and 36 weeks.  3.  Mild anemia.  The patient complains of fatigue.  Counseled regarding oral iron and the patient would like to try this.  A prescription has been sent.

## 2024-01-05 ENCOUNTER — ROUTINE PRENATAL (OUTPATIENT)
Dept: OBSTETRICS AND GYNECOLOGY | Facility: CLINIC | Age: 29
End: 2024-01-05
Payer: MEDICAID

## 2024-01-05 VITALS — SYSTOLIC BLOOD PRESSURE: 120 MMHG | DIASTOLIC BLOOD PRESSURE: 70 MMHG | WEIGHT: 176 LBS | BODY MASS INDEX: 31.18 KG/M2

## 2024-01-05 DIAGNOSIS — Z34.83 PRENATAL CARE, SUBSEQUENT PREGNANCY, THIRD TRIMESTER: Primary | ICD-10-CM

## 2024-01-05 LAB
GLUCOSE UR STRIP-MCNC: NEGATIVE MG/DL
PROT UR STRIP-MCNC: NEGATIVE MG/DL

## 2024-01-05 NOTE — PROGRESS NOTES
CC: Obstetric visit    HPI: 28-year-old  2 para 1 presents at 32-0/7 weeks for her obstetric visit.  Her baby is moving actively.  She complains of some right-sided abdominal pain which is associated with fetal movement.    Review of systems    This is negative for nausea or vomiting.  Negative for vaginal bleeding.  Negative for fever or chills.    Physical examination    The abdomen is soft and gravid.  There is no epigastric tenderness.  No right upper quadrant tenderness.  No fundal tenderness.  No suprapubic tenderness.  Extremities are equal in size    Assessment and plan    1.  Intrauterine pregnancy at 32-0/7 weeks  2.  Counseled regarding American Academy of pediatrics recommendation for the RSV vaccine between 32 and 36 weeks.  The patient plans to proceed over the next several days.  3.  The patient reports abdominal pain in the right side of the uterus associated with fetal movement.  She has no vaginal bleeding.  I am not able to reproduce the symptoms on physical examination today.  I recommend an ultrasound to assess the placenta and the patient agrees with this.

## 2024-01-19 ENCOUNTER — ROUTINE PRENATAL (OUTPATIENT)
Dept: OBSTETRICS AND GYNECOLOGY | Facility: CLINIC | Age: 29
End: 2024-01-19
Payer: MEDICAID

## 2024-01-19 VITALS — BODY MASS INDEX: 31.39 KG/M2 | WEIGHT: 177.2 LBS | SYSTOLIC BLOOD PRESSURE: 117 MMHG | DIASTOLIC BLOOD PRESSURE: 74 MMHG

## 2024-01-19 DIAGNOSIS — Z34.93 THIRD TRIMESTER PREGNANCY: Primary | ICD-10-CM

## 2024-01-19 DIAGNOSIS — Z3A.34 34 WEEKS GESTATION OF PREGNANCY: ICD-10-CM

## 2024-01-19 LAB
GLUCOSE UR STRIP-MCNC: NEGATIVE MG/DL
PROT UR STRIP-MCNC: NEGATIVE MG/DL

## 2024-01-19 NOTE — PROGRESS NOTES
OB VISIT 34 WEEKS      Chief Complaint   Patient presents with    Routine Prenatal Visit     Routien prenatal appointment. 34w0d. Reports lower back pain.          Kelly is a 28 y.o.  34w0d being seen today for her obstetrical visit.  Patient reports no complaints. Fetal movement: normal. The patient currently sees Dr. Aguilar.       REVIEW OF SYSTEMS  Cramping/contractions: denies  Vaginal bleeding: denies  Fetal movement: present  Leaking of fluid: denies    Review of Systems   All other systems reviewed and are negative.      /74   Wt 80.4 kg (177 lb 3.2 oz)   LMP 2023   BMI 31.39 kg/m²     Physical Exam  Constitutional:       General: She is awake.      Appearance: Normal appearance. She is well-developed, well-groomed and normal weight.   HENT:      Head: Normocephalic and atraumatic.   Pulmonary:      Effort: Pulmonary effort is normal.   Musculoskeletal:      Cervical back: Normal range of motion.   Neurological:      General: No focal deficit present.      Mental Status: She is alert and oriented to person, place, and time.   Skin:     General: Skin is warm and dry.   Psychiatric:         Mood and Affect: Mood normal.         Behavior: Behavior normal. Behavior is cooperative.   Vitals reviewed.         ASSESSMENT/PLAN    Diagnoses and all orders for this visit:    1. Third trimester pregnancy (Primary)  -     POC Urinalysis Dipstick    2. 34 weeks gestation of pregnancy  -     POC Urinalysis Dipstick         US in office today to assess placenta - preliminary read below - discussed with patient.   Reviewed this stage of pregnancy.  GBS next visit.   Problem list updated.     Follow up in 2 weeks with Dr. Aguilar.     I spent 15 minutes caring for Kelly on this date of service. This time includes time spent by me in the following activities: preparing for the visit, reviewing tests, obtaining and/or reviewing a separately obtained history, performing a medically appropriate examination  and/or evaluation, counseling and educating the patient/family/caregiver, ordering medications, tests, or procedures, referring and communicating with other health care professionals, documenting information in the medical record, and care coordination.       Selena Rivers CNM  1/19/2024  19:22 EST     GROWTH ULTRASOUND PRELIMINARY RESULT   1/19/2024  34w0d    INDICATION: for fetal growth, abdominal pain  FHR: 151 bpm  BPD: 8.29 cm  HC: 29.98 cm  AC: 28.79 cm  FL: 6.50 cm  OVERALL: 29.9%   EFW: 2120 g, 4lb 11oz  MICHELLE: 10.97 cm  PRESENTATION: vertex  PLACENTA: anterior  INTERVAL GROWTH: appropriate, growth appropriate for gestational age.  CONSISTENT WITH DATES: yes   COMMENTS: normal appearing anterior placenta, fetus appears male, suboptimal visualization of fetus was secondary to fetal position, attenuation  COMPARATIVE DATA: not available for examination.  Selena Rivers CNM  1/19/2024  19:14 EST

## 2024-02-04 NOTE — PATIENT INSTRUCTIONS
TIPS FOR CERVICAL DILATION    SEXUAL INTERCOURSE  Make sure you lie down for a short amount of time afterwards to make sure the semen gets to your cervix.    YOGA/BIRTHING BALL  Bouncing on the ball and rotating hips can be effective for bringing baby down into the pelvis.    EVENING PRIMROSE OIL CAPSULES  In the evening right before going to bed, poke a hole in the end of a capsule and insert it into the vagina just as you would a tampon. There is an oil inside and some may leak out so you may want to wear a pad.   Also take one capsule by mouth each evening.   CURB WALKING  Walk with one foot on the curb and one on the ground. Make sure to turn around and walk back with the other foot to keep the hips even.    Alternatively you can do walking on stairs if no curb is available.  RED RASPBERRY LEAF TEA  2 cups a day if possible (no more than 2 daily)   It will not effect it's efficacy to add sugar, honey, or artificial sweetener if needed.   DATES  Eat 6 Medjool dates per day, starting at 36 weeks.  If you do not like the consistency of dates you can try Fig Newtons.   BREAST PUMPING   15 minutes each day, but no more than that  MEMBRANE STRIPPING   Done in the office by a provider  OTHER OPTIONS  Chiropractics  Acupuncture     **Avoid taking mineral/castor oil by itself as seen in old rafa's tales. It is associated with diarrhea and has not been found to be effective.**

## 2024-02-04 NOTE — PROGRESS NOTES
OB VISIT 36 WEEKS      Chief Complaint   Patient presents with    Routine Prenatal Visit         Kelly is a 28 y.o.  37w0d being seen today for her obstetrical visit.  Patient reports no complaints. Fetal movement: normal. The patient currently sees Dr. Aguilar.       REVIEW OF SYSTEMS  Cramping/contractions: denies  Vaginal bleeding: denies  Fetal movement: present  Leaking of fluid: denies    Review of Systems   Eyes:  Negative for blurred vision, double vision and visual disturbance.   Gastrointestinal:  Negative for abdominal pain.   Neurological:  Negative for light-headedness and headache.   All other systems reviewed and are negative.      /76   Wt 82.1 kg (181 lb)   LMP 2023   BMI 32.06 kg/m²     Physical Exam  Constitutional:       General: She is awake.      Appearance: Normal appearance. She is well-developed and well-groomed.   HENT:      Head: Normocephalic and atraumatic.   Pulmonary:      Effort: Pulmonary effort is normal.   Musculoskeletal:      Cervical back: Normal range of motion.   Neurological:      General: No focal deficit present.      Mental Status: She is alert and oriented to person, place, and time.   Skin:     General: Skin is warm and dry.   Psychiatric:         Mood and Affect: Mood normal.         Behavior: Behavior normal. Behavior is cooperative.   Vitals reviewed.         ASSESSMENT/PLAN    Diagnoses and all orders for this visit:    1. Third trimester pregnancy (Primary)  -     Group B Streptococcus Culture - Swab, Vaginal/Rectum  -     POC Urinalysis Dipstick    2. 36 weeks gestation of pregnancy  -     Group B Streptococcus Culture - Swab, Vaginal/Rectum  -     POC Urinalysis Dipstick         Cervical Exam: performed: Closed, 0% , -4.  Urine dip today: negative protein, negative glucose.   GBS swab collected and sent today.    Dilation tips given to patient via AVS.   Reviewed fetal kick counts  Reviewed this stage of pregnancy  Problem list updated   Is  aware of hospital location, when to go in.    Follow up in 1 week with Dr. Aguilar.     I spent 15 minutes caring for Kelly on this date of service. This time includes time spent by me in the following activities: preparing for the visit, reviewing tests, obtaining and/or reviewing a separately obtained history, performing a medically appropriate examination and/or evaluation, counseling and educating the patient/family/caregiver, ordering medications, tests, or procedures, referring and communicating with other health care professionals, documenting information in the medical record, independently interpreting results and communicating that information with the patient/family/caregiver, and care coordination.    Selena Rivers CNM  2/9/2024  21:44 EST

## 2024-02-09 ENCOUNTER — ROUTINE PRENATAL (OUTPATIENT)
Dept: OBSTETRICS AND GYNECOLOGY | Facility: CLINIC | Age: 29
End: 2024-02-09
Payer: MEDICAID

## 2024-02-09 VITALS — SYSTOLIC BLOOD PRESSURE: 126 MMHG | BODY MASS INDEX: 32.06 KG/M2 | WEIGHT: 181 LBS | DIASTOLIC BLOOD PRESSURE: 76 MMHG

## 2024-02-09 DIAGNOSIS — Z3A.36 36 WEEKS GESTATION OF PREGNANCY: ICD-10-CM

## 2024-02-09 DIAGNOSIS — Z34.93 THIRD TRIMESTER PREGNANCY: Primary | ICD-10-CM

## 2024-02-09 LAB
GLUCOSE UR STRIP-MCNC: NEGATIVE MG/DL
PROT UR STRIP-MCNC: NEGATIVE MG/DL

## 2024-02-14 LAB — B-HEM STREP SPEC QL CULT: POSITIVE

## 2024-02-16 ENCOUNTER — ROUTINE PRENATAL (OUTPATIENT)
Dept: OBSTETRICS AND GYNECOLOGY | Facility: CLINIC | Age: 29
End: 2024-02-16
Payer: MEDICAID

## 2024-02-16 VITALS — SYSTOLIC BLOOD PRESSURE: 128 MMHG | DIASTOLIC BLOOD PRESSURE: 76 MMHG | WEIGHT: 183 LBS | BODY MASS INDEX: 32.42 KG/M2

## 2024-02-16 DIAGNOSIS — Z3A.38 38 WEEKS GESTATION OF PREGNANCY: Primary | ICD-10-CM

## 2024-02-16 LAB
GLUCOSE UR STRIP-MCNC: NEGATIVE MG/DL
PROT UR STRIP-MCNC: NEGATIVE MG/DL

## 2024-02-17 NOTE — PROGRESS NOTES
OB VISIT 38 WEEKS      Chief Complaint   Patient presents with    Routine Prenatal Visit         Kelly is a 28 y.o.  39w0d being seen today for her obstetrical visit.  Patient reports no complaints. Fetal movement: normal. The patient currently sees Dr. Aguilar.       REVIEW OF SYSTEMS  Cramping/contractions: denies  Vaginal bleeding: denies  Fetal movement: present  Leaking of fluid: denies    Review of Systems   Eyes:  Negative for blurred vision, double vision and visual disturbance.   Gastrointestinal:  Negative for abdominal pain.   Neurological:  Negative for light-headedness and headache.   All other systems reviewed and are negative.      /74   Wt 83.9 kg (185 lb)   LMP 2023   BMI 32.77 kg/m²     Physical Exam  Constitutional:       General: She is awake.      Appearance: Normal appearance. She is well-developed and well-groomed.   HENT:      Head: Normocephalic and atraumatic.   Pulmonary:      Effort: Pulmonary effort is normal.   Musculoskeletal:      Cervical back: Normal range of motion.   Neurological:      General: No focal deficit present.      Mental Status: She is alert and oriented to person, place, and time.   Skin:     General: Skin is warm and dry.   Psychiatric:         Mood and Affect: Mood normal.         Behavior: Behavior normal. Behavior is cooperative.   Vitals reviewed.         ASSESSMENT/PLAN    Diagnoses and all orders for this visit:    1. Third trimester pregnancy (Primary)  -     POC Urinalysis Dipstick    2. 38 weeks gestation of pregnancy  -     POC Urinalysis Dipstick         Cervical Exam: performed: 1 cm, 20% , -3.  Urine dip today: negative protein, negative glucose.   Dilation tips given to patient via AVS.   Reviewed fetal kick counts.  Reviewed this stage of pregnancy.  Problem list updated.   Is aware of hospital location, when to go in.    Follow up in 1 week with Dr. Aguilar.     I spent 15 minutes caring for Kelly on this date of service. This time  includes time spent by me in the following activities: preparing for the visit, reviewing tests, obtaining and/or reviewing a separately obtained history, performing a medically appropriate examination and/or evaluation, counseling and educating the patient/family/caregiver, ordering medications, tests, or procedures, referring and communicating with other health care professionals, documenting information in the medical record, independently interpreting results and communicating that information with the patient/family/caregiver, and care coordination.    Selena Rivers CNM  2/23/2024  20:56 EST  OB38

## 2024-02-23 ENCOUNTER — ROUTINE PRENATAL (OUTPATIENT)
Dept: OBSTETRICS AND GYNECOLOGY | Facility: CLINIC | Age: 29
End: 2024-02-23
Payer: MEDICAID

## 2024-02-23 VITALS — DIASTOLIC BLOOD PRESSURE: 74 MMHG | WEIGHT: 185 LBS | SYSTOLIC BLOOD PRESSURE: 120 MMHG | BODY MASS INDEX: 32.77 KG/M2

## 2024-02-23 DIAGNOSIS — Z3A.38 38 WEEKS GESTATION OF PREGNANCY: ICD-10-CM

## 2024-02-23 DIAGNOSIS — Z34.93 THIRD TRIMESTER PREGNANCY: Primary | ICD-10-CM

## 2024-02-23 LAB
GLUCOSE UR STRIP-MCNC: NEGATIVE MG/DL
LEUKOCYTE EST, POC: ABNORMAL
PROT UR STRIP-MCNC: NEGATIVE MG/DL

## 2024-03-01 ENCOUNTER — ROUTINE PRENATAL (OUTPATIENT)
Dept: OBSTETRICS AND GYNECOLOGY | Facility: CLINIC | Age: 29
End: 2024-03-01
Payer: MEDICAID

## 2024-03-01 VITALS — WEIGHT: 185 LBS | SYSTOLIC BLOOD PRESSURE: 122 MMHG | DIASTOLIC BLOOD PRESSURE: 83 MMHG | BODY MASS INDEX: 32.77 KG/M2

## 2024-03-01 DIAGNOSIS — Z34.93 THIRD TRIMESTER PREGNANCY: Primary | ICD-10-CM

## 2024-03-01 DIAGNOSIS — R82.998 LEUKOCYTES IN URINE: ICD-10-CM

## 2024-03-01 DIAGNOSIS — Z3A.40 40 WEEKS GESTATION OF PREGNANCY: ICD-10-CM

## 2024-03-01 NOTE — PROGRESS NOTES
OB VISIT 40 WEEKS      Chief Complaint   Patient presents with    Routine Prenatal Visit         Kelly is a 28 y.o.  40w0d being seen today for her obstetrical visit.  Patient reports no complaints. Fetal movement: normal. The patient currently sees Dr. Aguilar.       REVIEW OF SYSTEMS  Cramping/contractions: denies  Vaginal bleeding: denies  Fetal movement: present  Leaking of fluid: denies    Review of Systems   Eyes:  Negative for blurred vision, double vision and visual disturbance.   Gastrointestinal:  Negative for abdominal pain.   Neurological:  Negative for light-headedness and headache.   All other systems reviewed and are negative.      /83   Wt 83.9 kg (185 lb)   LMP 2023   BMI 32.77 kg/m²     Physical Exam  Constitutional:       General: She is awake.      Appearance: Normal appearance. She is well-developed and well-groomed.   HENT:      Head: Normocephalic and atraumatic.   Pulmonary:      Effort: Pulmonary effort is normal.   Musculoskeletal:      Cervical back: Normal range of motion.   Neurological:      General: No focal deficit present.      Mental Status: She is alert and oriented to person, place, and time.   Skin:     General: Skin is warm and dry.   Psychiatric:         Mood and Affect: Mood normal.         Behavior: Behavior normal. Behavior is cooperative.   Vitals reviewed.         ASSESSMENT/PLAN    Diagnoses and all orders for this visit:    1. Third trimester pregnancy (Primary)  -     POC Urinalysis Dipstick    2. 40 weeks gestation of pregnancy  -     POC Urinalysis Dipstick    3. Leukocytes in urine  -     Urine Culture - Urine, Urine, Clean Catch         Cervical Exam: performed: 1 cm, 20% , -3.  IOL discussed. Induction Choice: Patient declines induction at this time. .  Membrane sweep also discussed - declined by patient.   Dilation tips given to patient previously.   Urine dip today: negative protein, negative glucose.   Leukocytes in urine - urine sent for  culture.  Reviewed this stage of pregnancy.  Problem list updated.   Is aware of hospital location, when to go in.   Discussed next visit with [facility]ST if not delivered for reassurance of fetal status.     Follow up in 1 week with Dr. Aguilar.     I spent 15 minutes caring for Kelly on this date of service. This time includes time spent by me in the following activities: preparing for the visit, reviewing tests, obtaining and/or reviewing a separately obtained history, performing a medically appropriate examination and/or evaluation, counseling and educating the patient/family/caregiver, ordering medications, tests, or procedures, referring and communicating with other health care professionals, documenting information in the medical record, independently interpreting results and communicating that information with the patient/family/caregiver, and care coordination.     Selena Rivers CNM  3/1/2024  16:44 EST

## 2024-03-03 LAB
BACTERIA UR CULT: NORMAL
BACTERIA UR CULT: NORMAL

## 2024-03-05 ENCOUNTER — ROUTINE PRENATAL (OUTPATIENT)
Dept: OBSTETRICS AND GYNECOLOGY | Facility: CLINIC | Age: 29
End: 2024-03-05
Payer: MEDICAID

## 2024-03-05 VITALS — WEIGHT: 190 LBS | DIASTOLIC BLOOD PRESSURE: 74 MMHG | BODY MASS INDEX: 33.66 KG/M2 | SYSTOLIC BLOOD PRESSURE: 113 MMHG

## 2024-03-05 DIAGNOSIS — Z3A.40 40 WEEKS GESTATION OF PREGNANCY: Primary | ICD-10-CM

## 2024-03-05 PROCEDURE — 99213 OFFICE O/P EST LOW 20 MIN: CPT | Performed by: OBSTETRICS & GYNECOLOGY

## 2024-03-05 RX ORDER — ALBUTEROL SULFATE 90 UG/1
2 AEROSOL, METERED RESPIRATORY (INHALATION)
COMMUNITY
Start: 2024-01-22

## 2024-03-05 NOTE — PROGRESS NOTES
CC: Obstetric visit    HPI: 28-year-old  2 para 1 presents at 40-4/7 weeks for her obstetric visit.  Her baby is moving actively.  She has only rare contractions.    Review of systems    This is negative for fever or chills.  Negative for nausea or vomiting.  Negative for vaginal bleeding.    Physical examination    The abdomen is soft and gravid.  There is no epigastric tenderness.  No fundal tenderness.  No suprapubic tenderness.  Pelvic examination reveals normal female external genitalia.  There is no blood in the vaginal vault.  The cervix is 1 cm dilated  Extremities are equal in size    Assessment and plan    1.  Intrauterine pregnancy at 40-4/7 weeks  2.  Approaching postdates pregnancy.  Counseled and questions answered.  We discussed the benefits and risks of an induction of labor at 41 weeks versus continued twice weekly  testing and allowing pregnancy to progress to 42 weeks.  Nonstress test today was reactive.  I answered the patient's questions.  She would like to be induced at 41 weeks.  This has been scheduled for Friday.

## 2024-03-06 ENCOUNTER — HOSPITAL ENCOUNTER (INPATIENT)
Facility: HOSPITAL | Age: 29
LOS: 2 days | Discharge: HOME | End: 2024-03-08
Attending: OBSTETRICS & GYNECOLOGY | Admitting: OBSTETRICS & GYNECOLOGY
Payer: MEDICAID

## 2024-03-06 ENCOUNTER — ANESTHESIA EVENT (OUTPATIENT)
Dept: LABOR AND DELIVERY | Facility: HOSPITAL | Age: 29
End: 2024-03-06
Payer: MEDICAID

## 2024-03-06 ENCOUNTER — ANESTHESIA (OUTPATIENT)
Dept: LABOR AND DELIVERY | Facility: HOSPITAL | Age: 29
End: 2024-03-06
Payer: MEDICAID

## 2024-03-06 PROBLEM — Z34.90 PREGNANCY: Status: ACTIVE | Noted: 2024-03-06

## 2024-03-06 LAB
ABO GROUP BLD: NORMAL
ALBUMIN SERPL-MCNC: 3.6 G/DL (ref 3.5–5.2)
ALBUMIN/GLOB SERPL: 1.2 G/DL
ALP SERPL-CCNC: 167 U/L (ref 39–117)
ALT SERPL W P-5'-P-CCNC: 12 U/L (ref 1–33)
ANION GAP SERPL CALCULATED.3IONS-SCNC: 15.4 MMOL/L (ref 5–15)
AST SERPL-CCNC: 16 U/L (ref 1–32)
BASOPHILS # BLD AUTO: 0.05 10*3/MM3 (ref 0–0.2)
BASOPHILS NFR BLD AUTO: 0.4 % (ref 0–1.5)
BILIRUB SERPL-MCNC: 0.3 MG/DL (ref 0–1.2)
BLD GP AB SCN SERPL QL: NEGATIVE
BUN SERPL-MCNC: 7 MG/DL (ref 6–20)
BUN/CREAT SERPL: 12.1 (ref 7–25)
CALCIUM SPEC-SCNC: 8.4 MG/DL (ref 8.6–10.5)
CHLORIDE SERPL-SCNC: 101 MMOL/L (ref 98–107)
CO2 SERPL-SCNC: 18.6 MMOL/L (ref 22–29)
CREAT SERPL-MCNC: 0.58 MG/DL (ref 0.57–1)
DEPRECATED RDW RBC AUTO: 35.1 FL (ref 37–54)
EGFRCR SERPLBLD CKD-EPI 2021: 125.8 ML/MIN/1.73
EOSINOPHIL # BLD AUTO: 0.18 10*3/MM3 (ref 0–0.4)
EOSINOPHIL NFR BLD AUTO: 1.5 % (ref 0.3–6.2)
ERYTHROCYTE [DISTWIDTH] IN BLOOD BY AUTOMATED COUNT: 12.5 % (ref 12.3–15.4)
GLOBULIN UR ELPH-MCNC: 3 GM/DL
GLUCOSE SERPL-MCNC: 81 MG/DL (ref 65–99)
HCT VFR BLD AUTO: 37.4 % (ref 34–46.6)
HGB BLD-MCNC: 12.9 G/DL (ref 12–15.9)
IMM GRANULOCYTES # BLD AUTO: 0.08 10*3/MM3 (ref 0–0.05)
IMM GRANULOCYTES NFR BLD AUTO: 0.7 % (ref 0–0.5)
LYMPHOCYTES # BLD AUTO: 2.19 10*3/MM3 (ref 0.7–3.1)
LYMPHOCYTES NFR BLD AUTO: 18.2 % (ref 19.6–45.3)
MCH RBC QN AUTO: 27.3 PG (ref 26.6–33)
MCHC RBC AUTO-ENTMCNC: 34.5 G/DL (ref 31.5–35.7)
MCV RBC AUTO: 79.1 FL (ref 79–97)
MONOCYTES # BLD AUTO: 0.34 10*3/MM3 (ref 0.1–0.9)
MONOCYTES NFR BLD AUTO: 2.8 % (ref 5–12)
NEUTROPHILS NFR BLD AUTO: 76.4 % (ref 42.7–76)
NEUTROPHILS NFR BLD AUTO: 9.21 10*3/MM3 (ref 1.7–7)
NRBC BLD AUTO-RTO: 0 /100 WBC (ref 0–0.2)
PLATELET # BLD AUTO: 284 10*3/MM3 (ref 140–450)
PMV BLD AUTO: 9.2 FL (ref 6–12)
POTASSIUM SERPL-SCNC: 3.8 MMOL/L (ref 3.5–5.2)
PROT SERPL-MCNC: 6.6 G/DL (ref 6–8.5)
RBC # BLD AUTO: 4.73 10*6/MM3 (ref 3.77–5.28)
RH BLD: POSITIVE
SODIUM SERPL-SCNC: 135 MMOL/L (ref 136–145)
T PALLIDUM IGG SER QL: NORMAL
T&S EXPIRATION DATE: NORMAL
WBC NRBC COR # BLD AUTO: 12.05 10*3/MM3 (ref 3.4–10.8)

## 2024-03-06 PROCEDURE — 86850 RBC ANTIBODY SCREEN: CPT | Performed by: OBSTETRICS & GYNECOLOGY

## 2024-03-06 PROCEDURE — C1755 CATHETER, INTRASPINAL: HCPCS | Performed by: STUDENT IN AN ORGANIZED HEALTH CARE EDUCATION/TRAINING PROGRAM

## 2024-03-06 PROCEDURE — 80053 COMPREHEN METABOLIC PANEL: CPT | Performed by: OBSTETRICS & GYNECOLOGY

## 2024-03-06 PROCEDURE — 99202 OFFICE O/P NEW SF 15 MIN: CPT | Performed by: OBSTETRICS & GYNECOLOGY

## 2024-03-06 PROCEDURE — 59025 FETAL NON-STRESS TEST: CPT

## 2024-03-06 PROCEDURE — 25010000002 PENICILLIN G POTASSIUM PER 600000 UNITS: Performed by: OBSTETRICS & GYNECOLOGY

## 2024-03-06 PROCEDURE — 86901 BLOOD TYPING SEROLOGIC RH(D): CPT | Performed by: OBSTETRICS & GYNECOLOGY

## 2024-03-06 PROCEDURE — 25810000003 LACTATED RINGERS PER 1000 ML: Performed by: OBSTETRICS & GYNECOLOGY

## 2024-03-06 PROCEDURE — 86900 BLOOD TYPING SEROLOGIC ABO: CPT | Performed by: OBSTETRICS & GYNECOLOGY

## 2024-03-06 PROCEDURE — 85025 COMPLETE CBC W/AUTO DIFF WBC: CPT | Performed by: OBSTETRICS & GYNECOLOGY

## 2024-03-06 PROCEDURE — 86780 TREPONEMA PALLIDUM: CPT | Performed by: OBSTETRICS & GYNECOLOGY

## 2024-03-06 RX ORDER — PENICILLIN G 3000000 [IU]/50ML
3 INJECTION, SOLUTION INTRAVENOUS EVERY 4 HOURS
Status: DISCONTINUED | OUTPATIENT
Start: 2024-03-06 | End: 2024-03-07 | Stop reason: HOSPADM

## 2024-03-06 RX ORDER — EPHEDRINE SULFATE 50 MG/ML
5 INJECTION, SOLUTION INTRAVENOUS AS NEEDED
Status: DISCONTINUED | OUTPATIENT
Start: 2024-03-06 | End: 2024-03-07 | Stop reason: HOSPADM

## 2024-03-06 RX ORDER — TERBUTALINE SULFATE 1 MG/ML
0.25 INJECTION, SOLUTION SUBCUTANEOUS AS NEEDED
Status: DISCONTINUED | OUTPATIENT
Start: 2024-03-06 | End: 2024-03-07 | Stop reason: HOSPADM

## 2024-03-06 RX ORDER — SODIUM CHLORIDE 9 MG/ML
40 INJECTION, SOLUTION INTRAVENOUS AS NEEDED
Status: DISCONTINUED | OUTPATIENT
Start: 2024-03-06 | End: 2024-03-07 | Stop reason: HOSPADM

## 2024-03-06 RX ORDER — FAMOTIDINE 10 MG/ML
20 INJECTION, SOLUTION INTRAVENOUS ONCE AS NEEDED
Status: DISCONTINUED | OUTPATIENT
Start: 2024-03-06 | End: 2024-03-07 | Stop reason: HOSPADM

## 2024-03-06 RX ORDER — ONDANSETRON 2 MG/ML
4 INJECTION INTRAMUSCULAR; INTRAVENOUS EVERY 6 HOURS PRN
Status: DISCONTINUED | OUTPATIENT
Start: 2024-03-06 | End: 2024-03-07 | Stop reason: HOSPADM

## 2024-03-06 RX ORDER — SODIUM CHLORIDE 0.9 % (FLUSH) 0.9 %
10 SYRINGE (ML) INJECTION EVERY 12 HOURS SCHEDULED
Status: DISCONTINUED | OUTPATIENT
Start: 2024-03-06 | End: 2024-03-07 | Stop reason: HOSPADM

## 2024-03-06 RX ORDER — DIPHENHYDRAMINE HYDROCHLORIDE 50 MG/ML
12.5 INJECTION INTRAMUSCULAR; INTRAVENOUS EVERY 8 HOURS PRN
Status: DISCONTINUED | OUTPATIENT
Start: 2024-03-06 | End: 2024-03-07 | Stop reason: HOSPADM

## 2024-03-06 RX ORDER — FAMOTIDINE 20 MG/1
20 TABLET, FILM COATED ORAL 2 TIMES DAILY PRN
Status: DISCONTINUED | OUTPATIENT
Start: 2024-03-06 | End: 2024-03-07 | Stop reason: HOSPADM

## 2024-03-06 RX ORDER — ONDANSETRON 2 MG/ML
4 INJECTION INTRAMUSCULAR; INTRAVENOUS ONCE AS NEEDED
Status: DISCONTINUED | OUTPATIENT
Start: 2024-03-06 | End: 2024-03-07 | Stop reason: HOSPADM

## 2024-03-06 RX ORDER — SODIUM CHLORIDE, SODIUM LACTATE, POTASSIUM CHLORIDE, CALCIUM CHLORIDE 600; 310; 30; 20 MG/100ML; MG/100ML; MG/100ML; MG/100ML
125 INJECTION, SOLUTION INTRAVENOUS CONTINUOUS
Status: DISCONTINUED | OUTPATIENT
Start: 2024-03-06 | End: 2024-03-07

## 2024-03-06 RX ORDER — FENTANYL/ROPIVACAINE/NS/PF 2MCG/ML-.2
10 PLASTIC BAG, INJECTION (ML) INJECTION CONTINUOUS
Status: DISCONTINUED | OUTPATIENT
Start: 2024-03-06 | End: 2024-03-07

## 2024-03-06 RX ORDER — ACETAMINOPHEN 325 MG/1
650 TABLET ORAL EVERY 4 HOURS PRN
Status: DISCONTINUED | OUTPATIENT
Start: 2024-03-06 | End: 2024-03-07 | Stop reason: HOSPADM

## 2024-03-06 RX ORDER — LIDOCAINE HYDROCHLORIDE 10 MG/ML
0.5 INJECTION, SOLUTION INFILTRATION; PERINEURAL ONCE AS NEEDED
Status: DISCONTINUED | OUTPATIENT
Start: 2024-03-06 | End: 2024-03-07 | Stop reason: HOSPADM

## 2024-03-06 RX ORDER — SODIUM CHLORIDE 0.9 % (FLUSH) 0.9 %
10 SYRINGE (ML) INJECTION AS NEEDED
Status: DISCONTINUED | OUTPATIENT
Start: 2024-03-06 | End: 2024-03-07 | Stop reason: HOSPADM

## 2024-03-06 RX ORDER — ONDANSETRON 4 MG/1
4 TABLET, ORALLY DISINTEGRATING ORAL EVERY 6 HOURS PRN
Status: DISCONTINUED | OUTPATIENT
Start: 2024-03-06 | End: 2024-03-07 | Stop reason: HOSPADM

## 2024-03-06 RX ORDER — FAMOTIDINE 10 MG/ML
20 INJECTION, SOLUTION INTRAVENOUS 2 TIMES DAILY PRN
Status: DISCONTINUED | OUTPATIENT
Start: 2024-03-06 | End: 2024-03-07 | Stop reason: HOSPADM

## 2024-03-06 RX ORDER — MAGNESIUM CARB/ALUMINUM HYDROX 105-160MG
30 TABLET,CHEWABLE ORAL ONCE AS NEEDED
Status: DISCONTINUED | OUTPATIENT
Start: 2024-03-06 | End: 2024-03-07 | Stop reason: HOSPADM

## 2024-03-06 RX ADMIN — PENICILLIN G 3 MILLION UNITS: 3000000 INJECTION, SOLUTION INTRAVENOUS at 23:21

## 2024-03-06 RX ADMIN — SODIUM CHLORIDE 5 MILLION UNITS: 9 INJECTION, SOLUTION INTRAVENOUS at 19:25

## 2024-03-06 RX ADMIN — EPHEDRINE SULFATE 5 MG: 50 INJECTION INTRAVENOUS at 21:00

## 2024-03-06 RX ADMIN — SODIUM CHLORIDE, POTASSIUM CHLORIDE, SODIUM LACTATE AND CALCIUM CHLORIDE 999 ML/HR: 600; 310; 30; 20 INJECTION, SOLUTION INTRAVENOUS at 19:10

## 2024-03-06 RX ADMIN — Medication 10 ML/HR: at 20:22

## 2024-03-06 RX ADMIN — LIDOCAINE HYDROCHLORIDE 3 ML: 10; .005 INJECTION, SOLUTION EPIDURAL; INFILTRATION; INTRACAUDAL; PERINEURAL at 20:18

## 2024-03-06 NOTE — H&P (VIEW-ONLY)
"[facility] [place]    Patient Name: Kelly Stevens  YOB: 1995  MRN: [id number]  Admission Date: 3/6/2024  3:25 PM  Date of Service: 3/6/2024    Chief Complaint: Contractions (Started at 9am, 8-10 minutes apart)        Subjective     Kelly Stevens is a 28 y.o. female  at 40w5d with Estimated Date of Delivery: 3/1/24 who presents with the chief complaint listed above. Pt reports \"8/10\" in pain. Pt is scheduled for an induction in 2 days     She sees Kenroy Aguilar MD for her prenatal care. Her pregnancy has been complicated by:  postdates, GBBS    She describes fetal movement as normal.  She denies rupture of membranes.  She denies vaginal bleeding. She is feeling contractions.        Objective   There are no problems to display for this patient.       OB History    Para Term  AB Living   2 1 1 0 0 1   SAB IAB Ectopic Molar Multiple Live Births   0 0 0 0 0 1      # Outcome Date GA Lbr Lanre/2nd Weight Sex Type Anes PTL Lv   2 Current            1 Term      Vaginal unsp  N EDNA      Obstetric Comments    x1.  8lb 12oz. no difficulties with delivery.        Past Medical History:   Diagnosis Date    Asthma     Wears glasses        Past Surgical History:   Procedure Laterality Date    NOSE SURGERY  2016    WISDOM TOOTH EXTRACTION  10/2019       No current facility-administered medications on file prior to encounter.     Current Outpatient Medications on File Prior to Encounter   Medication Sig Dispense Refill    budesonide-formoterol (SYMBICORT) 80-4.5 MCG/ACT inhaler Inhale 2 puffs 2 (Two) Times a Day.      cetirizine (zyrTEC) 10 MG tablet Take 1 tablet by mouth.      ferrous sulfate 325 (65 FE) MG tablet Take 1 tablet by mouth Daily With Breakfast. 30 tablet 11    Prenatal Vit-Fe Fumarate-FA (prenatal vitamin 27-0.8) 27-0.8 MG tablet tablet Take 1 tablet by mouth Daily.      Ventolin  (90 Base) MCG/ACT inhaler Inhale 2 puffs.         Allergies   Allergen Reactions    " Elemental Sulfur     Sulfa Antibiotics Unknown - Low Severity    Ciprofloxacin Anxiety       Family History   Problem Relation Age of Onset    Hypertension Father     No Known Problems Mother     Uterine cancer Maternal Grandmother     Diabetes Maternal Grandmother     Colon cancer Maternal Grandfather     Breast cancer Neg Hx     Ovarian cancer Neg Hx        Social History     Socioeconomic History    Marital status: Single   Tobacco Use    Smoking status: Never    Smokeless tobacco: Never   Vaping Use    Vaping status: Never Used   Substance and Sexual Activity    Alcohol use: No    Drug use: No    Sexual activity: Yes     Partners: Male     Birth control/protection: None           Review of Systems   Constitutional:  Negative for chills and fever.   HENT: Negative.     Eyes:  Negative for photophobia and visual disturbance.   Respiratory:  Negative for shortness of breath.    Cardiovascular:  Negative for chest pain.   Gastrointestinal:  Positive for abdominal pain. Negative for nausea.   Psychiatric/Behavioral:  The patient is not nervous/anxious.    All other systems reviewed and are negative.         PHYSICAL EXAM:      VITAL SIGNS:  Vitals:    03/06/24 1500 03/06/24 1545   BP:  115/74   Pulse:  68   Resp:  16   Temp:  97.5 °F (36.4 °C)   TempSrc:  Oral   SpO2:  98%   Weight: 85.6 kg (188 lb 12.8 oz)             FHT'S:    130 with moderate variability and accels                                     PHYSICAL EXAM:      General: well developed; well nourished  no acute distress   Heart: Not performed.   Lungs   breathing is unlabored   Abdomen: Gravid and non tender     Extremities: trace edema, DTRs 1 plus, no clonus       Cervix: Per RN  Cervical Dilation (cm): 1-2  Cervical Effacement: 60%  Fetal Station: -2  Cervical Consistency: medium  Cervical Position: posterior     Contractions:   irregular                    LABS AND TESTING ORDERED:  Uterine and fetal monitoring  Urinalysis  Serial cervical  exams    LAB RESULTS:    No results found for this or any previous visit (from the past 24 hour(s)).    Lab Results   Component Value Date    ABO B 08/04/2023    RH Positive 08/04/2023       Lab Results   Component Value Date    STREPGPB Positive (A) 02/09/2024                 External Prenatal Results       Pregnancy Outside Results - Transcribed From Office Records - See Scanned Records For Details       Test Value Date Time    ABO  B  08/04/23 1406    Rh  Positive  08/04/23 1406    Antibody Screen  Negative  12/01/23 1227       Negative  08/04/23 1406    Varicella IgG       Rubella  4.61 index 08/04/23 1406    Hgb  11.9 g/dL 12/01/23 1227       13.1 g/dL 08/04/23 1406    Hct  34.5 % 12/01/23 1227       37.0 % 08/04/23 1406    Glucose Fasting GTT       Glucose Tolerance Test 1 hour       Glucose Tolerance Test 3 hour       Gonorrhea (discrete)  Negative  08/04/23 1531    Chlamydia (discrete)  Negative  08/04/23 1531    RPR  Non Reactive  08/04/23 1406    VDRL       Syphilis Antibody ^ <0.2 AI 04/03/22 1820    HBsAg  Negative  08/04/23 1406    Herpes Simplex Virus PCR       Herpes Simplex VIrus Culture       HIV  Non Reactive  08/04/23 1406    Hep C RNA Quant PCR       Hep C Antibody  Non Reactive  08/04/23 1406    AFP  26.5 ng/mL 09/08/23 1416    Group B Strep  Positive  02/09/24 0207    GBS Susceptibility to Clindamycin       GBS Susceptibility to Erythromycin       Fetal Fibronectin       Genetic Testing, Maternal Blood                 Drug Screening       Test Value Date Time    Urine Drug Screen       Amphetamine Screen  Negative ng/mL 08/04/23 1531    Barbiturate Screen  Negative ng/mL 08/04/23 1531    Benzodiazepine Screen  Negative ng/mL 08/04/23 1531    Methadone Screen  Negative ng/mL 08/04/23 1531    Phencyclidine Screen  Negative ng/mL 08/04/23 1531    Opiates Screen       THC Screen       Cocaine Screen       Propoxyphene Screen  Negative ng/mL 08/04/23 1531    Buprenorphine Screen  Negative ng/mL  11/23/15 1305    Methamphetamine Screen       Oxycodone Screen  Negative ng/mL 11/23/15 1305    Tricyclic Antidepressants Screen                 Legend    ^: Historical                              Impression:   @ 40w5d .  Final Diagnosis: early active labor, GBBS pos    Plan:  1 Discussed with Dr. Wolf who will admit patient, allow epidural, group B beta strep prophylaxis, and supportive care      Jayne Neri MD  3/6/2024  16:28 EST

## 2024-03-06 NOTE — OBED NOTES
"[place] [facility] [place] Name: Kelly Stevens  YOB: 1995  MRN: [id number]  Admission Date: 3/6/2024  3:25 PM  Date of Service: 3/6/2024    Chief Complaint: Contractions (Started at 9am, 8-10 minutes apart)        Subjective     Kelly Stevens is a 28 y.o. female  at 40w5d with Estimated Date of Delivery: 3/1/24 who presents with the chief complaint listed above. Pt reports \"8/10\" in pain. Pt is scheduled for an induction in 2 days     She sees Kenroy Aguilar MD for her prenatal care. Her pregnancy has been complicated by:  postdates, GBBS    She describes fetal movement as normal.  She denies rupture of membranes.  She denies vaginal bleeding. She is feeling contractions.        Objective   There are no problems to display for this patient.       OB History    Para Term  AB Living   2 1 1 0 0 1   SAB IAB Ectopic Molar Multiple Live Births   0 0 0 0 0 1      # Outcome Date GA Lbr Lanre/2nd Weight Sex Type Anes PTL Lv   2 Current            1 Term      Vaginal unsp  N EDNA      Obstetric Comments    x1.  8lb 12oz. no difficulties with delivery.        Past Medical History:   Diagnosis Date    Asthma     Wears glasses        Past Surgical History:   Procedure Laterality Date    NOSE SURGERY  2016    WISDOM TOOTH EXTRACTION  10/2019       No current facility-administered medications on file prior to encounter.     Current Outpatient Medications on File Prior to Encounter   Medication Sig Dispense Refill    budesonide-formoterol (SYMBICORT) 80-4.5 MCG/ACT inhaler Inhale 2 puffs 2 (Two) Times a Day.      cetirizine (zyrTEC) 10 MG tablet Take 1 tablet by mouth.      ferrous sulfate 325 (65 FE) MG tablet Take 1 tablet by mouth Daily With Breakfast. 30 tablet 11    Prenatal Vit-Fe Fumarate-FA (prenatal vitamin 27-0.8) 27-0.8 MG tablet tablet Take 1 tablet by mouth Daily.      Ventolin  (90 Base) MCG/ACT inhaler Inhale 2 puffs.         Allergies   Allergen Reactions    " Elemental Sulfur     Sulfa Antibiotics Unknown - Low Severity    Ciprofloxacin Anxiety       Family History   Problem Relation Age of Onset    Hypertension Father     No Known Problems Mother     Uterine cancer Maternal Grandmother     Diabetes Maternal Grandmother     Colon cancer Maternal Grandfather     Breast cancer Neg Hx     Ovarian cancer Neg Hx        Social History     Socioeconomic History    Marital status: Single   Tobacco Use    Smoking status: Never    Smokeless tobacco: Never   Vaping Use    Vaping status: Never Used   Substance and Sexual Activity    Alcohol use: No    Drug use: No    Sexual activity: Yes     Partners: Male     Birth control/protection: None           Review of Systems   Constitutional:  Negative for chills and fever.   HENT: Negative.     Eyes:  Negative for photophobia and visual disturbance.   Respiratory:  Negative for shortness of breath.    Cardiovascular:  Negative for chest pain.   Gastrointestinal:  Positive for abdominal pain. Negative for nausea.   Psychiatric/Behavioral:  The patient is not nervous/anxious.    All other systems reviewed and are negative.         PHYSICAL EXAM:      VITAL SIGNS:  Vitals:    03/06/24 1500 03/06/24 1545   BP:  115/74   Pulse:  68   Resp:  16   Temp:  97.5 °F (36.4 °C)   TempSrc:  Oral   SpO2:  98%   Weight: 85.6 kg (188 lb 12.8 oz)             FHT'S:    130 with moderate variability and accels                                     PHYSICAL EXAM:      General: well developed; well nourished  no acute distress   Heart: Not performed.   Lungs   breathing is unlabored   Abdomen: Gravid and non tender     Extremities: trace edema, DTRs 1 plus, no clonus       Cervix: Per RN  Cervical Dilation (cm): 1-2  Cervical Effacement: 60%  Fetal Station: -2  Cervical Consistency: medium  Cervical Position: posterior     Contractions:   irregular                    LABS AND TESTING ORDERED:  Uterine and fetal monitoring  Urinalysis  Serial cervical  exams    LAB RESULTS:    No results found for this or any previous visit (from the past 24 hour(s)).    Lab Results   Component Value Date    ABO B 08/04/2023    RH Positive 08/04/2023       Lab Results   Component Value Date    STREPGPB Positive (A) 02/09/2024                 External Prenatal Results       Pregnancy Outside Results - Transcribed From Office Records - See Scanned Records For Details       Test Value Date Time    ABO  B  08/04/23 1406    Rh  Positive  08/04/23 1406    Antibody Screen  Negative  12/01/23 1227       Negative  08/04/23 1406    Varicella IgG       Rubella  4.61 index 08/04/23 1406    Hgb  11.9 g/dL 12/01/23 1227       13.1 g/dL 08/04/23 1406    Hct  34.5 % 12/01/23 1227       37.0 % 08/04/23 1406    Glucose Fasting GTT       Glucose Tolerance Test 1 hour       Glucose Tolerance Test 3 hour       Gonorrhea (discrete)  Negative  08/04/23 1531    Chlamydia (discrete)  Negative  08/04/23 1531    RPR  Non Reactive  08/04/23 1406    VDRL       Syphilis Antibody ^ <0.2 AI 04/03/22 1820    HBsAg  Negative  08/04/23 1406    Herpes Simplex Virus PCR       Herpes Simplex VIrus Culture       HIV  Non Reactive  08/04/23 1406    Hep C RNA Quant PCR       Hep C Antibody  Non Reactive  08/04/23 1406    AFP  26.5 ng/mL 09/08/23 1416    Group B Strep  Positive  02/09/24 0207    GBS Susceptibility to Clindamycin       GBS Susceptibility to Erythromycin       Fetal Fibronectin       Genetic Testing, Maternal Blood                 Drug Screening       Test Value Date Time    Urine Drug Screen       Amphetamine Screen  Negative ng/mL 08/04/23 1531    Barbiturate Screen  Negative ng/mL 08/04/23 1531    Benzodiazepine Screen  Negative ng/mL 08/04/23 1531    Methadone Screen  Negative ng/mL 08/04/23 1531    Phencyclidine Screen  Negative ng/mL 08/04/23 1531    Opiates Screen       THC Screen       Cocaine Screen       Propoxyphene Screen  Negative ng/mL 08/04/23 1531    Buprenorphine Screen  Negative ng/mL  11/23/15 1305    Methamphetamine Screen       Oxycodone Screen  Negative ng/mL 11/23/15 1305    Tricyclic Antidepressants Screen                 Legend    ^: Historical                              Impression:   @ 40w5d .  Final Diagnosis: early active labor, GBBS pos    Plan:  1 Discussed with Dr. Wolf who will admit patient, allow epidural, group B beta strep prophylaxis, and supportive care      Jayne Neri MD  3/6/2024  16:28 EST

## 2024-03-07 PROCEDURE — 94799 UNLISTED PULMONARY SVC/PX: CPT

## 2024-03-07 PROCEDURE — 94640 AIRWAY INHALATION TREATMENT: CPT

## 2024-03-07 PROCEDURE — 94664 DEMO&/EVAL PT USE INHALER: CPT

## 2024-03-07 RX ORDER — OXYTOCIN/0.9 % SODIUM CHLORIDE 30/500 ML
125 PLASTIC BAG, INJECTION (ML) INTRAVENOUS ONCE AS NEEDED
Status: DISCONTINUED | OUTPATIENT
Start: 2024-03-07 | End: 2024-03-08 | Stop reason: HOSPADM

## 2024-03-07 RX ORDER — BISACODYL 10 MG
10 SUPPOSITORY, RECTAL RECTAL DAILY PRN
Status: DISCONTINUED | OUTPATIENT
Start: 2024-03-08 | End: 2024-03-08 | Stop reason: HOSPADM

## 2024-03-07 RX ORDER — PROMETHAZINE HYDROCHLORIDE 12.5 MG/1
12.5 SUPPOSITORY RECTAL EVERY 6 HOURS PRN
Status: DISCONTINUED | OUTPATIENT
Start: 2024-03-07 | End: 2024-03-08 | Stop reason: HOSPADM

## 2024-03-07 RX ORDER — IBUPROFEN 600 MG/1
600 TABLET ORAL EVERY 6 HOURS PRN
Status: DISCONTINUED | OUTPATIENT
Start: 2024-03-07 | End: 2024-03-08 | Stop reason: HOSPADM

## 2024-03-07 RX ORDER — OXYTOCIN/0.9 % SODIUM CHLORIDE 30/500 ML
999 PLASTIC BAG, INJECTION (ML) INTRAVENOUS ONCE
Status: COMPLETED | OUTPATIENT
Start: 2024-03-07 | End: 2024-03-07

## 2024-03-07 RX ORDER — ACETAMINOPHEN 325 MG/1
650 TABLET ORAL EVERY 6 HOURS PRN
Status: DISCONTINUED | OUTPATIENT
Start: 2024-03-07 | End: 2024-03-08 | Stop reason: HOSPADM

## 2024-03-07 RX ORDER — ONDANSETRON 2 MG/ML
4 INJECTION INTRAMUSCULAR; INTRAVENOUS EVERY 6 HOURS PRN
Status: DISCONTINUED | OUTPATIENT
Start: 2024-03-07 | End: 2024-03-08 | Stop reason: HOSPADM

## 2024-03-07 RX ORDER — ONDANSETRON 4 MG/1
4 TABLET, ORALLY DISINTEGRATING ORAL EVERY 6 HOURS PRN
Status: DISCONTINUED | OUTPATIENT
Start: 2024-03-07 | End: 2024-03-08 | Stop reason: HOSPADM

## 2024-03-07 RX ORDER — TRAMADOL HYDROCHLORIDE 50 MG/1
50 TABLET ORAL EVERY 6 HOURS PRN
Status: DISCONTINUED | OUTPATIENT
Start: 2024-03-07 | End: 2024-03-08 | Stop reason: HOSPADM

## 2024-03-07 RX ORDER — MISOPROSTOL 200 UG/1
800 TABLET ORAL ONCE AS NEEDED
Status: DISCONTINUED | OUTPATIENT
Start: 2024-03-07 | End: 2024-03-07 | Stop reason: HOSPADM

## 2024-03-07 RX ORDER — METHYLERGONOVINE MALEATE 0.2 MG/ML
200 INJECTION INTRAVENOUS ONCE AS NEEDED
Status: DISCONTINUED | OUTPATIENT
Start: 2024-03-07 | End: 2024-03-07 | Stop reason: HOSPADM

## 2024-03-07 RX ORDER — PRENATAL VIT/IRON FUM/FOLIC AC 27MG-0.8MG
1 TABLET ORAL DAILY
Status: DISCONTINUED | OUTPATIENT
Start: 2024-03-07 | End: 2024-03-08 | Stop reason: HOSPADM

## 2024-03-07 RX ORDER — BUDESONIDE AND FORMOTEROL FUMARATE DIHYDRATE 80; 4.5 UG/1; UG/1
2 AEROSOL RESPIRATORY (INHALATION)
Status: DISCONTINUED | OUTPATIENT
Start: 2024-03-07 | End: 2024-03-08 | Stop reason: HOSPADM

## 2024-03-07 RX ORDER — TRANEXAMIC ACID 10 MG/ML
1000 INJECTION, SOLUTION INTRAVENOUS ONCE AS NEEDED
Status: DISCONTINUED | OUTPATIENT
Start: 2024-03-07 | End: 2024-03-07

## 2024-03-07 RX ORDER — HYDROCORTISONE 25 MG/G
CREAM TOPICAL AS NEEDED
Status: DISCONTINUED | OUTPATIENT
Start: 2024-03-07 | End: 2024-03-08 | Stop reason: HOSPADM

## 2024-03-07 RX ORDER — OXYTOCIN/0.9 % SODIUM CHLORIDE 30/500 ML
250 PLASTIC BAG, INJECTION (ML) INTRAVENOUS CONTINUOUS
Status: DISPENSED | OUTPATIENT
Start: 2024-03-07 | End: 2024-03-07

## 2024-03-07 RX ORDER — DOCUSATE SODIUM 100 MG/1
100 CAPSULE, LIQUID FILLED ORAL 2 TIMES DAILY
Status: DISCONTINUED | OUTPATIENT
Start: 2024-03-07 | End: 2024-03-08 | Stop reason: HOSPADM

## 2024-03-07 RX ORDER — PROMETHAZINE HYDROCHLORIDE 25 MG/1
25 TABLET ORAL EVERY 6 HOURS PRN
Status: DISCONTINUED | OUTPATIENT
Start: 2024-03-07 | End: 2024-03-08 | Stop reason: HOSPADM

## 2024-03-07 RX ORDER — SODIUM CHLORIDE 0.9 % (FLUSH) 0.9 %
1-10 SYRINGE (ML) INJECTION AS NEEDED
Status: DISCONTINUED | OUTPATIENT
Start: 2024-03-07 | End: 2024-03-08 | Stop reason: HOSPADM

## 2024-03-07 RX ORDER — CARBOPROST TROMETHAMINE 250 UG/ML
250 INJECTION, SOLUTION INTRAMUSCULAR
Status: DISCONTINUED | OUTPATIENT
Start: 2024-03-07 | End: 2024-03-07 | Stop reason: HOSPADM

## 2024-03-07 RX ADMIN — BUDESONIDE AND FORMOTEROL FUMARATE DIHYDRATE 2 PUFF: 80; 4.5 AEROSOL RESPIRATORY (INHALATION) at 08:23

## 2024-03-07 RX ADMIN — Medication 1 TABLET: at 08:09

## 2024-03-07 RX ADMIN — Medication: at 08:21

## 2024-03-07 RX ADMIN — ACETAMINOPHEN 325MG 650 MG: 325 TABLET ORAL at 01:19

## 2024-03-07 RX ADMIN — TRAMADOL HYDROCHLORIDE 50 MG: 50 TABLET ORAL at 05:32

## 2024-03-07 RX ADMIN — Medication 999 ML/HR: at 00:38

## 2024-03-07 RX ADMIN — ACETAMINOPHEN 325MG 650 MG: 325 TABLET ORAL at 16:45

## 2024-03-07 RX ADMIN — ACETAMINOPHEN 325MG 650 MG: 325 TABLET ORAL at 07:56

## 2024-03-07 RX ADMIN — DOCUSATE SODIUM 100 MG: 100 CAPSULE, LIQUID FILLED ORAL at 08:09

## 2024-03-07 RX ADMIN — DOCUSATE SODIUM 100 MG: 100 CAPSULE, LIQUID FILLED ORAL at 21:41

## 2024-03-07 NOTE — PROGRESS NOTES
VAGINAL DELIVERY POSTPARTUM DAY OF DELIVERY    3/7/2024  PATIENT: Kelly Stevens        MR#:[id number]  LOCATION: [facility]  DATE OF ADMISSION: 3/6/2024  ADMISSION  DIAGNOSIS:    (normal spontaneous vaginal delivery)    Pregnancy     CURRENT DIAGNOSIS: No notes have been filed under this hospital service.  Service: Hospitalist      SUBJECTIVE     Kelly feels well.  Patient describes her lochia as less than menses.  Pain is well controlled.       OBJECTIVE   Temp: Temp:  [97 °F (36.1 °C)-98.7 °F (37.1 °C)] 97 °F (36.1 °C) Temp src: Oral   BP: BP: ()/(46-79) 103/62        Pulse: Heart Rate:  [56-83] 60  RR: Resp:  [16-20] 20    General:  Awake, alert, no acute distress   Cardiac: Regular rate and rhythm    Respiratory: Lungs clear bilaterally, normal respiratory effort    Abdomen: Soft, non-distended, fundus firm, below umbilicus, appropriately tender   Pelvis: deferred   Extremities: Calves NT bilaterally, DTR 2+, no clonus noted, trace edema     Lab Results   Component Value Date    WBC 12.05 (H) 2024    HGB 12.9 2024    HCT 37.4 2024     2024    ABORH B Rh Positive 2015    AST 16 2024    ALT 12 2024    CREATININE 0.58 2024       ASSESSMENT: Day of vaginal delivery. Hgb: 12.9.    PLAN: Doing well. Continue routine supportive postpartum care.    Selena Rivers CNM  09:16 EST  2024

## 2024-03-07 NOTE — ANESTHESIA PREPROCEDURE EVALUATION
Anesthesia Evaluation     Patient summary reviewed and Nursing notes reviewed   NPO Solid Status: > 6 hours  NPO Liquid Status: > 2 hours           Airway   Mallampati: II  TM distance: >3 FB  Neck ROM: full  Dental - normal exam     Pulmonary    (+) asthma,  (-) COPD, not a smoker  Cardiovascular   Exercise tolerance: good (4-7 METS)    (-) past MI, dysrhythmias, angina      Neuro/Psych  (-) seizures, CVA  GI/Hepatic/Renal/Endo    (-) GERD, liver disease, no renal disease, diabetes, no thyroid disorder    Musculoskeletal     Abdominal    Substance History      OB/GYN    (+) Pregnant        Other                    Anesthesia Plan    ASA 2     epidural     (40w5d)    Anesthetic plan, risks, benefits, and alternatives have been provided, discussed and informed consent has been obtained with: patient.    CODE STATUS:    Level Of Support Discussed With: Patient  Code Status (Patient has no pulse and is not breathing): CPR (Attempt to Resuscitate)  Medical Interventions (Patient has pulse or is breathing): Full Support

## 2024-03-07 NOTE — PLAN OF CARE
Goal Outcome Evaluation:         Progressing well, has voided once , breastfeeding, minimal bleeding

## 2024-03-07 NOTE — LACTATION NOTE
Pt to stress lab for stress test via wheelchair and transport in stable condition.   Pt wanting assistance with latching baby. Baby is sleepy at this time.  gave pt hand pump with instructions on use and cleaning.LC assisted pt with pumping. Pt was able to get 1.2 cc's of colostrum. LC showed pt how to syringe feed baby all pumped colostrum. Baby tolerated well. Baby perked up and then latched to left breast in football position. Baby is BF well at this time. Encouraged to BF at least every 2- 3 hours for 10-15 min on each breast, pump and give all pumped colostrum if baby is not waking to BF.  Call LC as needed.      Lactation Consult Note    Evaluation Completed: 3/7/2024 16:17 EST  Patient Name: Kelly Stevens  :  1995  MRN:  [id number]     REFERRAL  INFORMATION:                          Date of Referral: 24   Person Making Referral: patient          DELIVERY HISTORY:        Skin to skin initiation date/time: 3/7/2024  12:37 AM   Skin to skin end date/time: 3/7/2024  1:40 AM        MATERNAL ASSESSMENT:     Breast Shape: round (24 1100)  Breast Density: soft (24 1100)  Areola: elastic (24 1100)  Nipples: everted (24 1100)                INFANT ASSESSMENT:  Information for the patient's :  Omaira Stevens [[id number]]   No past medical history on file.                                                                                                   MATERNAL INFANT FEEDING:     Maternal Emotional State: relaxed, receptive (24 1100)  Infant Positioning: clutch/football (24 1100)   Signs of Milk Transfer: deep jaw excursions noted (24 1100)  Pain with Feeding: no (24 1100)                       Latch Assistance: minimal assistance (24 1100)                               EQUIPMENT TYPE:                                 BREAST PUMPING:          LACTATION REFERRALS:

## 2024-03-07 NOTE — L&D DELIVERY NOTE
[facility]   Vaginal Delivery Note    Patient Name: Kelly Stevens  : 1995  MRN: [id number]    Date of Delivery: 3/7/2024     Diagnosis     Pre & Post-Delivery:  Intrauterine pregnancy at 40w6d  Labor status: Spontaneous Onset of Labor      (normal spontaneous vaginal delivery)    Pregnancy             Problem List    Transfer to Postpartum     Review the Delivery Report for details.     Delivery     Delivery: Vaginal, Spontaneous     YOB: 2025    Time of Birth:  Gestational Age 12:36 AM   40w6d     Anesthesia: Epidural     Delivering clinician: Yari Brand    Forceps?   No   Vacuum? No    Shoulder dystocia present: No        Delivery narrative: Patient is a 28-year-old -0-0-1 who was admitted at 40 weeks and 5 days in labor.  Her pregnancy had been uncomplicated.  She was 2 to 3 cm on admission and GBS positive.  She was started on penicillin.  She received an epidural for pain control.  She progressed along a normal multiparous labor curve to complete dilation and +2 station with no augmentation and had spontaneous rupture of membranes for clear fluid.    When she began pushing, she did have a fore bag that was ruptured for small amount of clear fluid.  She pushed with 2 contractions and delivered a liveborn male in the occiput anterior position over an intact perineum.  With gentle posterior guidance of the fetal head and maternal pushing, the right anterior shoulder was easily delivered, followed by the remainder of the body.  The infant was immediately vigorous and placed on mom's chest.  Delayed cord clamping was performed for 30 seconds and cord was clamped and cut by the father.  Cord blood was collected and sent.  The placenta then delivered spontaneously and was intact with a three-vessel cord.  This was discarded.  Inspection of her perineum revealed no lacerations and was hemostatic.  Bimanual exam revealed a firm uterus with no remaining products of  "conception.  She tolerated the procedure well.  All counts were correct at the end of the procedure.      Infant     Findings: male  infant     Infant observations: Weight: 3230 g (7 lb 1.9 oz)   Length: 20.5  in  Observations/Comments:  Scale 1      Apgars: 8  @ 1 minute /    9  @ 5 minutes   Infant Name:      Placenta & Cord         Placenta delivered  Spontaneous  at   3/7/2024 12:38 AM     Cord: 3 vessels  present.   Nuchal Cord?  no   Cord blood obtained: Yes    Cord gases obtained:  No    Cord gas results: Venous:  No results found for: \"PHCVEN\", \"BECVEN\"    Arterial:  No results found for: \"PHCART\", \"BECART\"     Repair     Episiotomy: None     No    Lacerations: No   Estimated Blood Loss:       Quantitative Blood Loss: Quantitative Blood Loss (mL): 41 mL (03/07/24 0040)        Complications     none    Disposition     Mother to Mother Baby/Postpartum  in stable condition currently.  Baby to remains with mom  in stable condition currently.    Yari Brand MD  03/07/24  10:09 EST        "

## 2024-03-07 NOTE — PLAN OF CARE
Goal Outcome Evaluation:  Plan of Care Reviewed With: patient        Progress: improving       VSS, fundus and lochia WDL, up ad corrie, voiding without difficulty, pain control with PO meds, working on breast feeding.   Problem: Adult Inpatient Plan of Care  Goal: Plan of Care Review  Outcome: Ongoing, Progressing  Flowsheets (Taken 3/7/2024 1707)  Progress: improving  Plan of Care Reviewed With: patient  Goal: Patient-Specific Goal (Individualized)  Outcome: Ongoing, Progressing  Goal: Absence of Hospital-Acquired Illness or Injury  Outcome: Ongoing, Progressing  Intervention: Identify and Manage Fall Risk  Description: Perform standard risk assessment on admission using a validated tool or comprehensive approach appropriate to the patient; reassess fall risk frequently, with change in status or transfer to another level of care.  Communicate fall injury risk to interprofessional healthcare team.  Determine need for increased observation, equipment and environmental modification, such as low bed, signage and supportive, nonskid footwear.  Adjust safety measures to individual developmental age, stage and identified risk factors.  Reinforce the importance of safety and physical activity with patient and family.  Perform regular intentional rounding to assess need for position change, pain assessment and personal needs, including assistance with toileting.  Recent Flowsheet Documentation  Taken 3/7/2024 1645 by Emilee Monte RN  Safety Promotion/Fall Prevention: safety round/check completed  Taken 3/7/2024 1644 by Emilee Monte RN  Safety Promotion/Fall Prevention: safety round/check completed  Taken 3/7/2024 1250 by Emilee Monte RN  Safety Promotion/Fall Prevention: safety round/check completed  Taken 3/7/2024 1045 by Emilee Monte RN  Safety Promotion/Fall Prevention: safety round/check completed  Taken 3/7/2024 0850 by Emilee Monte RN  Safety Promotion/Fall Prevention: safety round/check  completed  Taken 3/7/2024 0756 by Emilee Monte RN  Safety Promotion/Fall Prevention: safety round/check completed  Taken 3/7/2024 0735 by Emilee Monte RN  Safety Promotion/Fall Prevention: safety round/check completed  Intervention: Prevent Skin Injury  Description: Perform a screening for skin injury risk, such as pressure or moisture associated skin damage on admission and at regular intervals throughout hospital stay.  Keep all areas of skin (especially folds) clean and dry.  Maintain adequate skin hydration.  Relieve and redistribute pressure and protect bony prominences; implement measures based on patient-specific risk factors.  Match turning and repositioning schedule to clinical condition.  Encourage weight shift frequently; assist with reposition if unable to complete independently.  Float heels off bed; avoid pressure on the Achilles tendon.  Keep skin free from extended contact with medical devices.  Encourage functional activity and mobility, as early as tolerated.  Use aids (e.g., slide boards, mechanical lift) during transfer.  Recent Flowsheet Documentation  Taken 3/7/2024 0756 by Emilee Monte RN  Body Position: position changed independently  Intervention: Prevent and Manage VTE (Venous Thromboembolism) Risk  Description: Assess for VTE (venous thromboembolism) risk.  Encourage and assist with early ambulation.  Initiate and maintain compression or other therapy, as indicated, based on identified risk in accordance with organizational protocol and provider order.  Encourage both active and passive leg exercises while in bed, if unable to ambulate.  Recent Flowsheet Documentation  Taken 3/7/2024 0756 by Emilee Monte RN  Activity Management:   up ad corrie   ambulated to bathroom  Intervention: Prevent Infection  Description: Maintain skin and mucous membrane integrity; promote hand, oral and pulmonary hygiene.  Optimize fluid balance, nutrition, sleep and glycemic control to maximize  infection resistance.  Identify potential sources of infection early to prevent or mitigate progression of infection (e.g., wound, lines, devices).  Evaluate ongoing need for invasive devices; remove promptly when no longer indicated.  Recent Flowsheet Documentation  Taken 3/7/2024 0756 by Emilee Monte RN  Infection Prevention:   hand hygiene promoted   rest/sleep promoted  Goal: Optimal Comfort and Wellbeing  Outcome: Ongoing, Progressing  Intervention: Monitor Pain and Promote Comfort  Description: Assess pain level, treatment efficacy and patient response at regular intervals using a consistent pain scale.  Consider the presence and impact of preexisting chronic pain.  Encourage patient and caregiver involvement in pain assessment, interventions and safety measures.  Recent Flowsheet Documentation  Taken 3/7/2024 1645 by Emilee Monte RN  Pain Management Interventions: see MAR  Taken 3/7/2024 0756 by Emilee Monte RN  Pain Management Interventions:   see MAR   heat applied  Intervention: Provide Person-Centered Care  Description: Use a family-focused approach to care.  Develop trust and rapport by proactively providing information, encouraging questions, addressing concerns and offering reassurance.  Acknowledge emotional response to hospitalization.  Recognize and utilize personal coping strategies.  Honor spiritual and cultural preferences.  Recent Flowsheet Documentation  Taken 3/7/2024 0756 by Emilee Monte RN  Trust Relationship/Rapport:   care explained   choices provided   questions answered   questions encouraged  Goal: Readiness for Transition of Care  Outcome: Ongoing, Progressing     Problem: Adjustment to Role Transition (Postpartum Vaginal Delivery)  Goal: Successful Maternal Role Transition  Outcome: Ongoing, Progressing  Intervention: Support Maternal Role Transition  Description: Develop trust, relationship and rapport.  Use a family-focused approach; promote involvement of support  system in infant care.  Incorporate cultural beliefs, rituals and practices in family-centered care.  Encourage and support breastfeeding, frequent holding and skin-to-skin contact with .  Encourage attachment behaviors; promote involvement in infant care.  Monitor maternal emotional state, as well as maternal-infant interaction.  Encourage and answer questions; share resources for support following discharge.  Recent Flowsheet Documentation  Taken 3/7/2024 0756 by Emilee Monte RN  Supportive Measures:   active listening utilized   decision-making supported   positive reinforcement provided   self-care encouraged     Problem: Bleeding (Postpartum Vaginal Delivery)  Goal: Hemostasis  Outcome: Ongoing, Progressing     Problem: Infection (Postpartum Vaginal Delivery)  Goal: Absence of Infection Signs/Symptoms  Outcome: Ongoing, Progressing  Intervention: Prevent or Manage Infection  Description: Encourage perineal care; if present, monitor episiotomy site for swelling, redness and drainage.  Implement transmission-based precautions and isolation, as indicated, to prevent spread of infection.  Obtain cultures prior to initiating antimicrobial therapy. Do not delay treatment for laboratory results with presence of high suspicion. Note: If endometritis is suspected, treatment may be initiated without obtaining cultures.  Administer ordered antimicrobial therapy promptly; reassess need regularly.  Identify and manage signs of early sepsis, such as increased heart rate and decreased blood pressure, as well as changes in mental state, respiratory pattern or peripheral perfusion.  If perineal wound infection is identified, anticipate need for suture removal, debridement and cleansing.  Notify infant’s care provider of maternal infection.  Recent Flowsheet Documentation  Taken 3/7/2024 0756 by Emilee Monte RN  Perineal Care:   absorbent brief/pad changed   perineum cleansed     Problem: Pain (Postpartum Vaginal  Delivery)  Goal: Acceptable Pain Control  Outcome: Ongoing, Progressing  Intervention: Prevent or Manage Pain  Description: Determine pain management plan with patient and caregiver; review plan regularly.  Use a consistent, validated tool for pain assessment; evaluate pain level, effect of treatment and patient’s response at regular intervals.  Monitor perineal condition; note presence of hematoma, hemorrhoids and episiotomy appearance (if applicable).  Use cold application, as culturally-appropriate, to the perineal area for the first 24 to 48 hours following delivery for comfort.  Verify correct infant latch when breastfeeding to prevent nipple pain.  Consider the presence and impact of preexisting chronic pain.  Encourage patient and caregiver involvement in pain assessment, interventions and safety measures.  Individualize pharmacologic pain management plan; titrate medication to patient response.  Monitor and address medication-induced side effects, such as constipation, nausea, vomiting.  Initiate individualized nonpharmacologic pain management measures.  Consider and address emotional response to pain.  If engorgement occurs, encourage more frequent breastfeeding or pumping and storing additional milk to ease discomfort.  Note: Cold compresses, as culturally-appropriate, may be used if bottle-feeding.  If post-dural puncture headache identified, encourage adequate hydration and anticipate the need for epidural blood patch.  If hemorrhoids are present and painful, offer topical pain relief and sitz baths for comfort.  Recent Flowsheet Documentation  Taken 3/7/2024 1645 by Emilee Monte, RN  Pain Management Interventions: see MAR  Taken 3/7/2024 0756 by Emilee Monte, RN  Pain Management Interventions:   see MAR   heat applied     Problem: Urinary Retention (Postpartum Vaginal Delivery)  Goal: Effective Urinary Elimination  Outcome: Ongoing, Progressing     Problem: Device-Related Complication Risk  (Anesthesia/Analgesia, Neuraxial)  Goal: Safe Infusion Delivery Completion  Outcome: Ongoing, Progressing     Problem: Infection (Anesthesia/Analgesia, Neuraxial)  Goal: Absence of Infection Signs and Symptoms  Outcome: Ongoing, Progressing  Intervention: Prevent or Manage Infection  Description: Protect and secure insertion site; observe for signs of infection, sepsis or line dislodgement.  Provide meticulous dressing and catheter care to decrease risk of infection; maintain closed system.  Optimize fluid balance, nutrition, sleep and glycemic control to maximize resistance and promote healing.  Identify potential sources of infection early to prevent or mitigate progression of infection (e.g., wound, lines, devices).  Obtain cultures prior to initiating antimicrobial therapy, when possible. Do not delay treatment for laboratory results in the presence of high suspicion or clinical indicators.  Administer ordered antimicrobial therapy promptly; reassess need regularly.  Provide fever-reduction and comfort measures. Note: Maternal fever can occur during neuraxial analgesia/anesthesia that is not associated with infection. Source of temperature elevation should be investigated.  Recent Flowsheet Documentation  Taken 3/7/2024 0756 by Emilee Monte, RN  Infection Prevention:   hand hygiene promoted   rest/sleep promoted     Problem: Nausea and Vomiting (Anesthesia/Analgesia, Neuraxial)  Goal: Nausea and Vomiting Relief  Outcome: Ongoing, Progressing     Problem: Pain (Anesthesia/Analgesia, Neuraxial)  Goal: Effective Pain Control  Outcome: Ongoing, Progressing  Intervention: Prevent or Manage Pain  Description: Determine pain management plan with patient and caregiver; review plan regularly.  Individualize pharmacologic pain management plan; titrate medication to patient response.  Combine multimodal analgesia and nonpharmacologic strategies to help potentiate synergistic effects, enhance comfort and improve function  that may include complementary therapy, diversional activity and mindfulness.  Provide around-the-clock dosing of pain medication to keep pain levels in control.  Manage medication-induced effects, such as respiratory depression, constipation, nausea and vomiting.  Minimize pain stimuli; coordinate care and adjust environment (e.g., light, noise, unnecessary movement); promote sleep/rest for optimal healing.  Recent Flowsheet Documentation  Taken 3/7/2024 1645 by Emilee Monte RN  Pain Management Interventions: see MAR  Taken 3/7/2024 0756 by Emilee Monte RN  Pain Management Interventions:   see MAR   heat applied  Diversional Activities: smartphone     Problem: Respiratory Compromise (Anesthesia/Analgesia, Neuraxial)  Goal: Effective Oxygenation and Ventilation  Outcome: Ongoing, Progressing  Intervention: Optimize Oxygenation and Ventilation  Description: Use a validated screening tool to identify risk for obstructive sleep apnea prior to placement; monitor for signs of hypoventilation.  Implement continuous pulse oximetry to detect apnea-related oxygen desaturation events.  Maintain patent airway; assess need for additional respiratory support.  Elevate head of bed and position to minimize risk of ventilation/perfusion mismatch, airway collapse/obstruction and aspiration.  Stimulate patient to increase arousal and respiratory effort.  Encourage pulmonary hygiene, such as cough-enhancement and airway-clearance techniques that may include use of incentive spirometry, deep breathing and cough.  Provide oxygen therapy judiciously, if hypoxemia present.  Recent Flowsheet Documentation  Taken 3/7/2024 0756 by Emilee Monte RN  Head of Bed (HOB) Positioning: HOB elevated     Problem: Sensorimotor Impairment (Anesthesia/Analgesia, Neuraxial)  Goal: Baseline Motor Function  Outcome: Ongoing, Progressing  Intervention: Optimize Sensorimotor Function  Description: Protect skin and areas of decreased sensation from  moisture, heat, pressure, friction or shearing forces.  Position body with joints in neutral alignment; facilitate frequent position changes.  Monitor vital signs, oxygenation and dermatomes for level of anesthesia.  Monitor motor strength and ability to safely ambulate.  Implement environmental safety measures to decrease fall risk (e.g., declutter, lighting, assistive devices); reassess risk frequently.  Recent Flowsheet Documentation  Taken 3/7/2024 1645 by Emilee Monte RN  Safety Promotion/Fall Prevention: safety round/check completed  Taken 3/7/2024 1644 by Emilee Monte RN  Safety Promotion/Fall Prevention: safety round/check completed  Taken 3/7/2024 1250 by Emilee Monte RN  Safety Promotion/Fall Prevention: safety round/check completed  Taken 3/7/2024 1045 by Emilee Monte RN  Safety Promotion/Fall Prevention: safety round/check completed  Taken 3/7/2024 0850 by Emilee Monte RN  Safety Promotion/Fall Prevention: safety round/check completed  Taken 3/7/2024 0756 by Emilee Monte RN  Safety Promotion/Fall Prevention: safety round/check completed  Taken 3/7/2024 0735 by Emilee Monte RN  Safety Promotion/Fall Prevention: safety round/check completed     Problem: Urinary Retention (Anesthesia/Analgesia, Neuraxial)  Goal: Effective Urinary Elimination  Outcome: Ongoing, Progressing     Problem:  Fall Injury Risk  Goal: Absence of Fall, Infant Drop and Related Injury  Outcome: Ongoing, Progressing  Intervention: Identify and Manage Contributors  Description: Develop a fall prevention plan with the patient and family.  Promote use of personal vision and auditory aids.  Assess infant location, status and maternal assistance level required for safe and effective self and infant care; provide support for toileting, mobility and placement of infant in crib, as needed.  Define behavior and activity limits to patient and family to decrease fall or drop risk.  If fall occurs, assess the  severity of injury; implement fall injury protocol. Determine the cause and revise fall injury prevention plan.  If fall occurs during pregnancy, assess fetal heart rate and movement, presence of uterine contractions or vaginal bleeding; verify membrane status.  Regularly review medication and contribution to fall risk; consider polypharmacy and high-risk medications that may include neuraxial anesthesia, sedation and narcotic analgesia given within the last 24 hours.  Balance adequate pain management with potential for oversedation.  Recent Flowsheet Documentation  Taken 3/7/2024 0756 by Emilee Monte RN  Medication Review/Management: medications reviewed  Intervention: Promote Injury-Free Environment  Description: Provide a safe, barrier-free environment that encourages independent activity.  Keep care area uncluttered and well-lighted.  Determine the need for increased observation or monitoring.  Avoid use of devices that minimize mobility, such as restraints or indwelling urinary catheter.  Recent Flowsheet Documentation  Taken 3/7/2024 1645 by Emilee Monte RN  Safety Promotion/Fall Prevention: safety round/check completed  Taken 3/7/2024 1644 by Emilee Monte RN  Safety Promotion/Fall Prevention: safety round/check completed  Taken 3/7/2024 1250 by Emilee Monte RN  Safety Promotion/Fall Prevention: safety round/check completed  Taken 3/7/2024 1045 by Emilee Monte RN  Safety Promotion/Fall Prevention: safety round/check completed  Taken 3/7/2024 0850 by Emilee Monte RN  Safety Promotion/Fall Prevention: safety round/check completed  Taken 3/7/2024 0756 by Emilee Monte RN  Safety Promotion/Fall Prevention: safety round/check completed  Taken 3/7/2024 0735 by Emilee Monte RN  Safety Promotion/Fall Prevention: safety round/check completed     Problem: Skin Injury Risk Increased  Goal: Skin Health and Integrity  Outcome: Ongoing, Progressing  Intervention: Optimize Skin  Protection  Description: Perform a full pressure injury risk assessment, as indicated by screening, upon admission to care unit.  Reassess skin (injury risk, full inspection) frequently (e.g., scheduled interval, with change in condition) to provide optimal early detection and prevention.  Maintain adequate tissue perfusion (e.g., encourage fluid balance; avoid crossing legs, constrictive clothing or devices) to promote tissue oxygenation.  Maintain head of bed at lowest degree of elevation tolerated, considering medical condition and other restrictions.  Avoid positioning onto an area that remains reddened.  Minimize incontinence and moisture (e.g., toileting schedule; moisture-wicking pad, diaper or incontinence collection device; skin moisture barrier).  Cleanse skin promptly and gently when soiled utilizing a pH-balanced cleanser.  Relieve and redistribute pressure (e.g., scheduled position changes, weight shifts, use of support surface, medical device repositioning, protective dressing application, use of positioning device, microclimate control, use of pressure-injury-monitor  Encourage increased activity, such as sitting in a chair at the bedside or early mobilization, when able to tolerate.  Recent Flowsheet Documentation  Taken 3/7/2024 0756 by Emilee Monte, RN  Head of Bed (HOB) Positioning: HOB elevated

## 2024-03-07 NOTE — PLAN OF CARE
Goal Outcome Evaluation:  Plan of Care Reviewed With: patient, spouse        Progress: improving  Outcome Evaluation: Transfer to postpartum. Light bleeding noted at this time. VS at baseline and pain 4/10. Tylenol given per order at patient's request instead of ibuprofen. Breastfeeding. Intact perineum.

## 2024-03-07 NOTE — ANESTHESIA PROCEDURE NOTES
Labor Epidural      Patient reassessed immediately prior to procedure    Patient location during procedure: OB  Performed By  Anesthesiologist: Scott Lucero MD  Preanesthetic Checklist  Completed: patient identified, IV checked, site marked, risks and benefits discussed, surgical consent, monitors and equipment checked, pre-op evaluation and timeout performed  Additional Notes  Difficulty due to positioning issues. Easily placed after epidural positioning device employed.   Prep:  Pt Position:sitting  Sterile Tech:gloves, cap, sterile barrier and mask  Prep:chlorhexidine gluconate and isopropyl alcohol  Monitoring:continuous pulse oximetry, EKG and blood pressure monitoring  Epidural Block Procedure:  Approach:midline  Guidance:ultrasound guided  Location:L2-L3  Needle Type:Tuohy  Needle Gauge:17 G  Loss of Resistance Medium: saline  Loss of Resistance: 6cm  Cath Depth at skin:11 cm  Paresthesia: none  Aspiration:negative  Test Dose:negative  Number of Attempts: 2  Post Assessment:  Dressing:secured with tape and occlusive dressing applied  Pt Tolerance:patient tolerated the procedure well with no apparent complications  Complications:no

## 2024-03-07 NOTE — ANESTHESIA POSTPROCEDURE EVALUATION
Patient: Kelly Stevens    Procedure Summary       Date: 03/06/24 Room / Location:     Anesthesia Start: 1958 Anesthesia Stop: 03/07/24 0036    Procedure: LABOR ANALGESIA Diagnosis:     Scheduled Providers:  Provider: William Velez MD    Anesthesia Type: epidural ASA Status: 2            Anesthesia Type: epidural    Vitals  Vitals Value Taken Time   BP 97/72 03/07/24 0254   Temp 36.8 °C (98.2 °F) 03/07/24 0040   Pulse 76 03/07/24 0254   Resp 16 03/07/24 0254   SpO2 100 % 03/07/24 0039           Post Anesthesia Care and Evaluation      Comments: Anesthesia present throughout labor and delivery

## 2024-03-07 NOTE — LACTATION NOTE
P2. BF first baby for 6 months. Pt wanting assistance with latching baby. Pt is able to hand express colostrum easily from right breast. LC assisted pt with latching baby to right breast in football hold. Baby is latching well at this time. Educated on importance of deep latching and ways to achieve it. Encouraged to BF at least every 2-3 hours for 10-15 min  on each breast. Call LC as needed. Pt has personal pump

## 2024-03-08 VITALS
RESPIRATION RATE: 17 BRPM | HEIGHT: 65 IN | TEMPERATURE: 97.5 F | SYSTOLIC BLOOD PRESSURE: 100 MMHG | WEIGHT: 188.8 LBS | BODY MASS INDEX: 31.46 KG/M2 | DIASTOLIC BLOOD PRESSURE: 64 MMHG | OXYGEN SATURATION: 100 % | HEART RATE: 66 BPM

## 2024-03-08 PROBLEM — Z34.90 PREGNANCY: Status: RESOLVED | Noted: 2024-03-06 | Resolved: 2024-03-08

## 2024-03-08 LAB
BASOPHILS # BLD AUTO: 0.06 10*3/MM3 (ref 0–0.2)
BASOPHILS NFR BLD AUTO: 0.5 % (ref 0–1.5)
DEPRECATED RDW RBC AUTO: 36.9 FL (ref 37–54)
EOSINOPHIL # BLD AUTO: 0.37 10*3/MM3 (ref 0–0.4)
EOSINOPHIL NFR BLD AUTO: 3 % (ref 0.3–6.2)
ERYTHROCYTE [DISTWIDTH] IN BLOOD BY AUTOMATED COUNT: 12.4 % (ref 12.3–15.4)
HCT VFR BLD AUTO: 33.9 % (ref 34–46.6)
HGB BLD-MCNC: 11.4 G/DL (ref 12–15.9)
IMM GRANULOCYTES # BLD AUTO: 0.06 10*3/MM3 (ref 0–0.05)
IMM GRANULOCYTES NFR BLD AUTO: 0.5 % (ref 0–0.5)
LYMPHOCYTES # BLD AUTO: 2.57 10*3/MM3 (ref 0.7–3.1)
LYMPHOCYTES NFR BLD AUTO: 20.9 % (ref 19.6–45.3)
MCH RBC QN AUTO: 27.5 PG (ref 26.6–33)
MCHC RBC AUTO-ENTMCNC: 33.6 G/DL (ref 31.5–35.7)
MCV RBC AUTO: 81.7 FL (ref 79–97)
MONOCYTES # BLD AUTO: 0.55 10*3/MM3 (ref 0.1–0.9)
MONOCYTES NFR BLD AUTO: 4.5 % (ref 5–12)
NEUTROPHILS NFR BLD AUTO: 70.6 % (ref 42.7–76)
NEUTROPHILS NFR BLD AUTO: 8.66 10*3/MM3 (ref 1.7–7)
NRBC BLD AUTO-RTO: 0 /100 WBC (ref 0–0.2)
PLATELET # BLD AUTO: 302 10*3/MM3 (ref 140–450)
PMV BLD AUTO: 9.3 FL (ref 6–12)
RBC # BLD AUTO: 4.15 10*6/MM3 (ref 3.77–5.28)
WBC NRBC COR # BLD AUTO: 12.27 10*3/MM3 (ref 3.4–10.8)

## 2024-03-08 PROCEDURE — 85025 COMPLETE CBC W/AUTO DIFF WBC: CPT | Performed by: OBSTETRICS & GYNECOLOGY

## 2024-03-08 RX ORDER — PSEUDOEPHEDRINE HCL 30 MG
100 TABLET ORAL 2 TIMES DAILY
Qty: 60 CAPSULE | Refills: 0 | Status: SHIPPED | OUTPATIENT
Start: 2024-03-08

## 2024-03-08 RX ORDER — IBUPROFEN 600 MG/1
600 TABLET ORAL EVERY 6 HOURS PRN
Qty: 60 TABLET | Refills: 0 | Status: SHIPPED | OUTPATIENT
Start: 2024-03-08

## 2024-03-08 RX ADMIN — DOCUSATE SODIUM 100 MG: 100 CAPSULE, LIQUID FILLED ORAL at 10:06

## 2024-03-08 RX ADMIN — ACETAMINOPHEN 325MG 650 MG: 325 TABLET ORAL at 06:28

## 2024-03-08 RX ADMIN — Medication 1 TABLET: at 10:06

## 2024-03-08 RX ADMIN — ACETAMINOPHEN 325MG 650 MG: 325 TABLET ORAL at 00:32

## 2024-03-08 NOTE — DISCHARGE SUMMARY
VAGINAL DELIVERY DISCHARGE SUMMARY      PATIENT: Kelly Stevens        MR#:[id number]  LOCATION: [facility]  ADMISSION  DIAGNOSIS:    (normal spontaneous vaginal delivery)     DISCHARGE DIAGNOSIS: No diagnosis found.      DATE OF ADMISSION: 3/6/2024  DATE OF DISCHARGE: 24     PROCEDURES:  Vaginal, Spontaneous     3/7/2024    12:36 AM      SERVICE: Obstetrics    HOSPITAL COURSE: Kelly underwent vaginal delivery of a male infant and remained in the hospital for 2 days. During that time, Kelly remained afebrile and hemodynamically stable. On the day of discharge, Kelly was eating, ambulating and voiding without difficulty.      VARICELLA: unknown immunity - varicella immunization ordered to be given on postpartum prior to discharge.  RUBELLA: immune.    LABS:   Lab Results   Component Value Date    WBC 12.27 (H) 2024    HGB 11.4 (L) 2024    HCT 33.9 (L) 2024    MCV 81.7 2024     2024    GLU 74 2022    CREATININE 0.58 2024    AST 16 2024    ALT 12 2024     Results from last 7 days   Lab Units 24  1910   ABO TYPING  B   RH TYPING  Positive   ANTIBODY SCREEN  Negative       DISCHARGE MEDICATIONS     Discharge Medications        New Medications        Instructions Start Date   docusate sodium 100 MG capsule   100 mg, Oral, 2 Times Daily      ibuprofen 600 MG tablet  Commonly known as: ADVIL,MOTRIN   600 mg, Oral, Every 6 Hours PRN             Continue These Medications        Instructions Start Date   budesonide-formoterol 80-4.5 MCG/ACT inhaler  Commonly known as: SYMBICORT   2 puffs, Inhalation, 2 Times Daily - RT      cetirizine 10 MG tablet  Commonly known as: zyrTEC   10 mg, Oral      ferrous sulfate 325 (65 FE) MG tablet   325 mg, Oral, Daily With Breakfast      prenatal vitamin 27-0.8 27-0.8 MG tablet tablet   1 tablet, Oral, Daily      Ventolin  (90 Base) MCG/ACT inhaler  Generic drug: albuterol sulfate HFA   2 puffs,  [FreeTextEntry1] : HTN: BP running low today. Review on prior readings have revealed some borderline low readings. Asymptomatic but she is tachycardic on exam to low 100s. 15 lb weight loss per EMR over last 6 months\par \par Will drop norvasc from 10mg to 5mg\par Continue losartan 25mg daily\par \par HLD: Lipids elevated, recently started on lipitor 20mg. Trend labs closely\par \par Abnormal ECG: Check TTE to ensure normal LV size and function \par \par RV 4M \par \par  Inhalation               DISCHARGE DISPOSITION: Home    DISCHARGE CONDITION: Stable    DISCHARGE DIET: Regular    ACTIVITY AT DISCHARGE: Pelvic rest    INFANT FEEDING PLANS: Breast    EDUCATION: Warning signs and symptoms given, no tub baths, nothing in the vagina for 6 weeks.     FOLLOW-UP APPOINTMENTS: Follow up with [facility]  in 4 to 6 weeks for routine postpartum visit.     Selena Rivers CNM  03/08/24  12:38 EST

## 2024-03-08 NOTE — PLAN OF CARE
Goal Outcome Evaluation:            Progressing well, voids without diff, pain controlled, breastfeeding, minimal bleeding

## 2024-03-08 NOTE — PLAN OF CARE
Goal Outcome Evaluation:  Plan of Care Reviewed With: patient        Progress: improving     VSS, fundus and lochia WDL, up ad corrie, voiding without difficulty, pain control with PO meds, breast feeding. DC today   Problem: Adult Inpatient Plan of Care  Goal: Plan of Care Review  Outcome: Met  Goal: Patient-Specific Goal (Individualized)  Outcome: Met  Goal: Absence of Hospital-Acquired Illness or Injury  Outcome: Met  Intervention: Identify and Manage Fall Risk  Description: Perform standard risk assessment on admission using a validated tool or comprehensive approach appropriate to the patient; reassess fall risk frequently, with change in status or transfer to another level of care.  Communicate fall injury risk to interprofessional healthcare team.  Determine need for increased observation, equipment and environmental modification, such as low bed, signage and supportive, nonskid footwear.  Adjust safety measures to individual developmental age, stage and identified risk factors.  Reinforce the importance of safety and physical activity with patient and family.  Perform regular intentional rounding to assess need for position change, pain assessment and personal needs, including assistance with toileting.  Recent Flowsheet Documentation  Taken 3/8/2024 1245 by Emilee Monte RN  Safety Promotion/Fall Prevention: safety round/check completed  Taken 3/8/2024 1006 by Emilee Monte RN  Safety Promotion/Fall Prevention: safety round/check completed  Taken 3/8/2024 0850 by Emilee Monte RN  Safety Promotion/Fall Prevention: safety round/check completed  Intervention: Prevent Skin Injury  Description: Perform a screening for skin injury risk, such as pressure or moisture associated skin damage on admission and at regular intervals throughout hospital stay.  Keep all areas of skin (especially folds) clean and dry.  Maintain adequate skin hydration.  Relieve and redistribute pressure and protect bony prominences;  implement measures based on patient-specific risk factors.  Match turning and repositioning schedule to clinical condition.  Encourage weight shift frequently; assist with reposition if unable to complete independently.  Float heels off bed; avoid pressure on the Achilles tendon.  Keep skin free from extended contact with medical devices.  Encourage functional activity and mobility, as early as tolerated.  Use aids (e.g., slide boards, mechanical lift) during transfer.  Recent Flowsheet Documentation  Taken 3/8/2024 1006 by Emilee Monte RN  Body Position: position changed independently  Intervention: Prevent and Manage VTE (Venous Thromboembolism) Risk  Description: Assess for VTE (venous thromboembolism) risk.  Encourage and assist with early ambulation.  Initiate and maintain compression or other therapy, as indicated, based on identified risk in accordance with organizational protocol and provider order.  Encourage both active and passive leg exercises while in bed, if unable to ambulate.  Recent Flowsheet Documentation  Taken 3/8/2024 1006 by Emilee Monte RN  Activity Management: up ad corrie  Intervention: Prevent Infection  Description: Maintain skin and mucous membrane integrity; promote hand, oral and pulmonary hygiene.  Optimize fluid balance, nutrition, sleep and glycemic control to maximize infection resistance.  Identify potential sources of infection early to prevent or mitigate progression of infection (e.g., wound, lines, devices).  Evaluate ongoing need for invasive devices; remove promptly when no longer indicated.  Recent Flowsheet Documentation  Taken 3/8/2024 1006 by Emilee Monte RN  Infection Prevention:   hand hygiene promoted   rest/sleep promoted  Goal: Optimal Comfort and Wellbeing  Outcome: Met  Intervention: Provide Person-Centered Care  Description: Use a family-focused approach to care.  Develop trust and rapport by proactively providing information, encouraging questions,  addressing concerns and offering reassurance.  Acknowledge emotional response to hospitalization.  Recognize and utilize personal coping strategies.  Honor spiritual and cultural preferences.  Recent Flowsheet Documentation  Taken 3/8/2024 1006 by Emilee Monte RN  Trust Relationship/Rapport:   care explained   choices provided   questions answered   questions encouraged  Goal: Readiness for Transition of Care  Outcome: Met     Problem: Adjustment to Role Transition (Postpartum Vaginal Delivery)  Goal: Successful Maternal Role Transition  Outcome: Met  Intervention: Support Maternal Role Transition  Description: Develop trust, relationship and rapport.  Use a family-focused approach; promote involvement of support system in infant care.  Incorporate cultural beliefs, rituals and practices in family-centered care.  Encourage and support breastfeeding, frequent holding and skin-to-skin contact with .  Encourage attachment behaviors; promote involvement in infant care.  Monitor maternal emotional state, as well as maternal-infant interaction.  Encourage and answer questions; share resources for support following discharge.  Recent Flowsheet Documentation  Taken 3/8/2024 1006 by Emilee Monte RN  Supportive Measures:   active listening utilized   decision-making supported   positive reinforcement provided   self-care encouraged     Problem: Bleeding (Postpartum Vaginal Delivery)  Goal: Hemostasis  Outcome: Met     Problem: Infection (Postpartum Vaginal Delivery)  Goal: Absence of Infection Signs/Symptoms  Outcome: Met  Intervention: Prevent or Manage Infection  Description: Encourage perineal care; if present, monitor episiotomy site for swelling, redness and drainage.  Implement transmission-based precautions and isolation, as indicated, to prevent spread of infection.  Obtain cultures prior to initiating antimicrobial therapy. Do not delay treatment for laboratory results with presence of high suspicion.  Note: If endometritis is suspected, treatment may be initiated without obtaining cultures.  Administer ordered antimicrobial therapy promptly; reassess need regularly.  Identify and manage signs of early sepsis, such as increased heart rate and decreased blood pressure, as well as changes in mental state, respiratory pattern or peripheral perfusion.  If perineal wound infection is identified, anticipate need for suture removal, debridement and cleansing.  Notify infant’s care provider of maternal infection.  Recent Flowsheet Documentation  Taken 3/8/2024 1006 by Emilee Monte RN  Perineal Care:   absorbent brief/pad changed   perineum cleansed     Problem: Pain (Postpartum Vaginal Delivery)  Goal: Acceptable Pain Control  Outcome: Met     Problem: Urinary Retention (Postpartum Vaginal Delivery)  Goal: Effective Urinary Elimination  Outcome: Met     Problem: Device-Related Complication Risk (Anesthesia/Analgesia, Neuraxial)  Goal: Safe Infusion Delivery Completion  Outcome: Met     Problem: Infection (Anesthesia/Analgesia, Neuraxial)  Goal: Absence of Infection Signs and Symptoms  Outcome: Met  Intervention: Prevent or Manage Infection  Description: Protect and secure insertion site; observe for signs of infection, sepsis or line dislodgement.  Provide meticulous dressing and catheter care to decrease risk of infection; maintain closed system.  Optimize fluid balance, nutrition, sleep and glycemic control to maximize resistance and promote healing.  Identify potential sources of infection early to prevent or mitigate progression of infection (e.g., wound, lines, devices).  Obtain cultures prior to initiating antimicrobial therapy, when possible. Do not delay treatment for laboratory results in the presence of high suspicion or clinical indicators.  Administer ordered antimicrobial therapy promptly; reassess need regularly.  Provide fever-reduction and comfort measures. Note: Maternal fever can occur during  neuraxial analgesia/anesthesia that is not associated with infection. Source of temperature elevation should be investigated.  Recent Flowsheet Documentation  Taken 3/8/2024 1006 by Emilee Monte RN  Infection Prevention:   hand hygiene promoted   rest/sleep promoted     Problem: Nausea and Vomiting (Anesthesia/Analgesia, Neuraxial)  Goal: Nausea and Vomiting Relief  Outcome: Met     Problem: Pain (Anesthesia/Analgesia, Neuraxial)  Goal: Effective Pain Control  Outcome: Met  Intervention: Prevent or Manage Pain  Description: Determine pain management plan with patient and caregiver; review plan regularly.  Individualize pharmacologic pain management plan; titrate medication to patient response.  Combine multimodal analgesia and nonpharmacologic strategies to help potentiate synergistic effects, enhance comfort and improve function that may include complementary therapy, diversional activity and mindfulness.  Provide around-the-clock dosing of pain medication to keep pain levels in control.  Manage medication-induced effects, such as respiratory depression, constipation, nausea and vomiting.  Minimize pain stimuli; coordinate care and adjust environment (e.g., light, noise, unnecessary movement); promote sleep/rest for optimal healing.  Recent Flowsheet Documentation  Taken 3/8/2024 1006 by Emilee Monte RN  Diversional Activities: smartphone     Problem: Respiratory Compromise (Anesthesia/Analgesia, Neuraxial)  Goal: Effective Oxygenation and Ventilation  Outcome: Met  Intervention: Optimize Oxygenation and Ventilation  Description: Use a validated screening tool to identify risk for obstructive sleep apnea prior to placement; monitor for signs of hypoventilation.  Implement continuous pulse oximetry to detect apnea-related oxygen desaturation events.  Maintain patent airway; assess need for additional respiratory support.  Elevate head of bed and position to minimize risk of ventilation/perfusion mismatch,  airway collapse/obstruction and aspiration.  Stimulate patient to increase arousal and respiratory effort.  Encourage pulmonary hygiene, such as cough-enhancement and airway-clearance techniques that may include use of incentive spirometry, deep breathing and cough.  Provide oxygen therapy judiciously, if hypoxemia present.  Recent Flowsheet Documentation  Taken 3/8/2024 1006 by Emilee Monte RN  Head of Bed (HOB) Positioning: HOB elevated     Problem: Sensorimotor Impairment (Anesthesia/Analgesia, Neuraxial)  Goal: Baseline Motor Function  Outcome: Met  Intervention: Optimize Sensorimotor Function  Description: Protect skin and areas of decreased sensation from moisture, heat, pressure, friction or shearing forces.  Position body with joints in neutral alignment; facilitate frequent position changes.  Monitor vital signs, oxygenation and dermatomes for level of anesthesia.  Monitor motor strength and ability to safely ambulate.  Implement environmental safety measures to decrease fall risk (e.g., declutter, lighting, assistive devices); reassess risk frequently.  Recent Flowsheet Documentation  Taken 3/8/2024 1245 by Emilee Monte RN  Safety Promotion/Fall Prevention: safety round/check completed  Taken 3/8/2024 1006 by Emilee Monte RN  Safety Promotion/Fall Prevention: safety round/check completed  Taken 3/8/2024 0850 by Emilee Monte RN  Safety Promotion/Fall Prevention: safety round/check completed     Problem: Urinary Retention (Anesthesia/Analgesia, Neuraxial)  Goal: Effective Urinary Elimination  Outcome: Met     Problem:  Fall Injury Risk  Goal: Absence of Fall, Infant Drop and Related Injury  Outcome: Met  Intervention: Identify and Manage Contributors  Description: Develop a fall prevention plan with the patient and family.  Promote use of personal vision and auditory aids.  Assess infant location, status and maternal assistance level required for safe and effective self and infant  care; provide support for toileting, mobility and placement of infant in crib, as needed.  Define behavior and activity limits to patient and family to decrease fall or drop risk.  If fall occurs, assess the severity of injury; implement fall injury protocol. Determine the cause and revise fall injury prevention plan.  If fall occurs during pregnancy, assess fetal heart rate and movement, presence of uterine contractions or vaginal bleeding; verify membrane status.  Regularly review medication and contribution to fall risk; consider polypharmacy and high-risk medications that may include neuraxial anesthesia, sedation and narcotic analgesia given within the last 24 hours.  Balance adequate pain management with potential for oversedation.  Recent Flowsheet Documentation  Taken 3/8/2024 1006 by Emilee Monte RN  Medication Review/Management: medications reviewed  Intervention: Promote Injury-Free Environment  Description: Provide a safe, barrier-free environment that encourages independent activity.  Keep care area uncluttered and well-lighted.  Determine the need for increased observation or monitoring.  Avoid use of devices that minimize mobility, such as restraints or indwelling urinary catheter.  Recent Flowsheet Documentation  Taken 3/8/2024 1245 by Emilee Monte, RN  Safety Promotion/Fall Prevention: safety round/check completed  Taken 3/8/2024 1006 by Emilee Monte RN  Safety Promotion/Fall Prevention: safety round/check completed  Taken 3/8/2024 0850 by Emilee Monte RN  Safety Promotion/Fall Prevention: safety round/check completed     Problem: Skin Injury Risk Increased  Goal: Skin Health and Integrity  Outcome: Met  Intervention: Optimize Skin Protection  Description: Perform a full pressure injury risk assessment, as indicated by screening, upon admission to care unit.  Reassess skin (injury risk, full inspection) frequently (e.g., scheduled interval, with change in condition) to provide optimal  early detection and prevention.  Maintain adequate tissue perfusion (e.g., encourage fluid balance; avoid crossing legs, constrictive clothing or devices) to promote tissue oxygenation.  Maintain head of bed at lowest degree of elevation tolerated, considering medical condition and other restrictions.  Avoid positioning onto an area that remains reddened.  Minimize incontinence and moisture (e.g., toileting schedule; moisture-wicking pad, diaper or incontinence collection device; skin moisture barrier).  Cleanse skin promptly and gently when soiled utilizing a pH-balanced cleanser.  Relieve and redistribute pressure (e.g., scheduled position changes, weight shifts, use of support surface, medical device repositioning, protective dressing application, use of positioning device, microclimate control, use of pressure-injury-monitor  Encourage increased activity, such as sitting in a chair at the bedside or early mobilization, when able to tolerate.  Recent Flowsheet Documentation  Taken 3/8/2024 1006 by Emliee Monte, RN  Head of Bed (HOB) Positioning: HOB elevated

## 2024-03-08 NOTE — PROGRESS NOTES
VAGINAL DELIVERY POSTPARTUM DAY 1    3/8/2024  PATIENT: Kelly Stevens        MR#:[id number]  LOCATION: [facility]  DATE OF ADMISSION: 3/6/2024  ADMISSION  DIAGNOSIS:    (normal spontaneous vaginal delivery)    Pregnancy     CURRENT DIAGNOSIS: No notes have been filed under this hospital service.  Service: Hospitalist      SUBJECTIVE     Kelly feels well.  Patient describes her lochia as less than menses.  Pain is well controlled.        OBJECTIVE   Temp: Temp:  [97.5 °F (36.4 °C)-97.7 °F (36.5 °C)] 97.5 °F (36.4 °C) Temp src: Oral   BP: BP: ()/(62-73) 100/64        Pulse: Heart Rate:  [61-77] 66  RR: Resp:  [16-18] 17    General:  Awake, alert, no acute distress   Cardiac: Regular rate and rhythm    Respiratory: Lungs clear bilaterally, normal respiratory effort    Abdomen: Soft, non-distended, fundus firm, below umbilicus, appropriately tender   Pelvis: deferred   Extremities: Calves NT bilaterally, DTR 2+, no clonus noted, trace edema     Lab Results   Component Value Date    WBC 12.27 (H) 2024    HGB 11.4 (L) 2024    HCT 33.9 (L) 2024     2024    ABORH B Rh Positive 2015    AST 16 2024    ALT 12 2024    CREATININE 0.58 2024       ASSESSMENT:  Postpartum day 1 after vaginal delivery. Hgb: 11.4.    PLAN:Doing well. Continue routine supportive care. Plan for discharge this afternoon per patient request.     Selena Rivers CNM  12:34 EST  2024

## 2024-03-15 ENCOUNTER — MATERNAL SCREENING (OUTPATIENT)
Dept: CALL CENTER | Facility: HOSPITAL | Age: 29
End: 2024-03-15
Payer: MEDICAID

## 2024-03-15 NOTE — OUTREACH NOTE
Maternal Screening Survey      Flowsheet Row Responses   Facility patient discharged from? [place]   Attempt successful? No   Unsuccessful attempts Attempt 1              Abundio FRANKS - Registered Nurse

## 2024-03-15 NOTE — OUTREACH NOTE
Maternal Screening Survey      Flowsheet Row Responses   Facility patient discharged from? [facility]   Attempt successful? No   Unsuccessful attempts Attempt 2              Abundio FRANKS - Registered Nurse

## 2024-03-16 ENCOUNTER — MATERNAL SCREENING (OUTPATIENT)
Dept: CALL CENTER | Facility: HOSPITAL | Age: 29
End: 2024-03-16
Payer: MEDICAID

## 2024-03-16 NOTE — OUTREACH NOTE
Maternal Screening Survey      Flowsheet Row Responses   Facility patient discharged from? [facility]   Attempt successful? No   Unsuccessful attempts Attempt 3   Revoke Decline to participate              Kathryn VENEGAS - Registered Nurse

## 2024-04-16 ENCOUNTER — PATIENT ROUNDING (BHMG ONLY) (OUTPATIENT)
Dept: FAMILY MEDICINE CLINIC | Facility: CLINIC | Age: 29
End: 2024-04-16

## 2024-04-16 ENCOUNTER — OFFICE VISIT (OUTPATIENT)
Dept: FAMILY MEDICINE CLINIC | Facility: CLINIC | Age: 29
End: 2024-04-16
Payer: MEDICAID

## 2024-04-16 VITALS
SYSTOLIC BLOOD PRESSURE: 120 MMHG | HEART RATE: 68 BPM | HEIGHT: 65 IN | OXYGEN SATURATION: 98 % | DIASTOLIC BLOOD PRESSURE: 70 MMHG | TEMPERATURE: 98.6 F | BODY MASS INDEX: 30.59 KG/M2 | WEIGHT: 183.6 LBS | RESPIRATION RATE: 17 BRPM

## 2024-04-16 DIAGNOSIS — Z76.89 ENCOUNTER TO ESTABLISH CARE: Primary | ICD-10-CM

## 2024-04-16 DIAGNOSIS — J30.89 ENVIRONMENTAL AND SEASONAL ALLERGIES: ICD-10-CM

## 2024-04-16 DIAGNOSIS — L24.9 IRRITANT CONTACT DERMATITIS, UNSPECIFIED TRIGGER: ICD-10-CM

## 2024-04-16 DIAGNOSIS — J45.20 MILD INTERMITTENT ASTHMA WITHOUT COMPLICATION: ICD-10-CM

## 2024-04-16 DIAGNOSIS — L60.0 INGROWN TOENAIL OF LEFT FOOT: ICD-10-CM

## 2024-04-16 DIAGNOSIS — E01.0 THYROMEGALY: ICD-10-CM

## 2024-04-16 DIAGNOSIS — Z00.00 ENCOUNTER FOR PREVENTIVE HEALTH EXAMINATION: ICD-10-CM

## 2024-04-16 DIAGNOSIS — Z13.220 LIPID SCREENING: ICD-10-CM

## 2024-04-16 LAB
ALBUMIN SERPL-MCNC: 4.5 G/DL (ref 3.5–5.2)
ALBUMIN/GLOB SERPL: 2 G/DL
ALP SERPL-CCNC: 135 U/L (ref 39–117)
ALT SERPL-CCNC: 94 U/L (ref 1–33)
AST SERPL-CCNC: 63 U/L (ref 1–32)
BILIRUB SERPL-MCNC: <0.2 MG/DL (ref 0–1.2)
BUN SERPL-MCNC: 15 MG/DL (ref 6–20)
BUN/CREAT SERPL: 19 (ref 7–25)
CALCIUM SERPL-MCNC: 9.7 MG/DL (ref 8.6–10.5)
CHLORIDE SERPL-SCNC: 101 MMOL/L (ref 98–107)
CHOLEST SERPL-MCNC: 180 MG/DL (ref 0–200)
CO2 SERPL-SCNC: 27.1 MMOL/L (ref 22–29)
CREAT SERPL-MCNC: 0.79 MG/DL (ref 0.57–1)
EGFRCR SERPLBLD CKD-EPI 2021: 104 ML/MIN/1.73
GLOBULIN SER CALC-MCNC: 2.3 GM/DL
GLUCOSE SERPL-MCNC: 83 MG/DL (ref 65–99)
HDLC SERPL-MCNC: 94 MG/DL (ref 40–60)
LDLC SERPL CALC-MCNC: 77 MG/DL (ref 0–100)
POTASSIUM SERPL-SCNC: 4.1 MMOL/L (ref 3.5–5.2)
PROT SERPL-MCNC: 6.8 G/DL (ref 6–8.5)
SODIUM SERPL-SCNC: 138 MMOL/L (ref 136–145)
T4 FREE SERPL-MCNC: 0.97 NG/DL (ref 0.93–1.7)
TRIGL SERPL-MCNC: 45 MG/DL (ref 0–150)
TSH SERPL DL<=0.005 MIU/L-ACNC: 0.8 UIU/ML (ref 0.27–4.2)
VLDLC SERPL CALC-MCNC: 9 MG/DL (ref 5–40)

## 2024-04-16 RX ORDER — BUDESONIDE AND FORMOTEROL FUMARATE DIHYDRATE 80; 4.5 UG/1; UG/1
2 AEROSOL RESPIRATORY (INHALATION)
Qty: 10.2 G | Refills: 5 | Status: SHIPPED | OUTPATIENT
Start: 2024-04-16

## 2024-04-16 RX ORDER — TRIAMCINOLONE ACETONIDE 1 MG/G
1 CREAM TOPICAL 2 TIMES DAILY
Qty: 30 G | Refills: 0 | Status: SHIPPED | OUTPATIENT
Start: 2024-04-16

## 2024-04-16 RX ORDER — CETIRIZINE HYDROCHLORIDE 10 MG/1
10 TABLET ORAL DAILY
Qty: 90 TABLET | Refills: 3 | Status: SHIPPED | OUTPATIENT
Start: 2024-04-16

## 2024-04-16 RX ORDER — ALBUTEROL SULFATE 90 UG/1
2 AEROSOL, METERED RESPIRATORY (INHALATION) EVERY 6 HOURS PRN
Qty: 18 G | Refills: 6 | Status: SHIPPED | OUTPATIENT
Start: 2024-04-16

## 2024-04-16 NOTE — PROGRESS NOTES
"Chief Complaint  Establish Care    Subjective        Kelly Stevens presents to [facility] MEDICAL [facility] [place] PRIMARY CARE  History of Present Illness  27yo WF Patient was previously NOT seen by PCP at [facility] clinic, and patient is new to me and is here for establishment of care visit for c/o ingrown toenail and rash.      Pt prior PCP was in [place], [place] with Dr. Benitez but now moved to [place].  Pt s/p child birth at [facility] on March 7, 2024.    Pt desires foot doc referral to [place] & [place] [facility] ankle for ingrown toenails on left.    Pt c/o intermittent b/l hand maculopapular rash just on hands distil to the wrists.    Instructed patient to get the adult preventive immunization that are due at  the pharmacy, including - PNEUMONIA, Tdap,. COVID booster.  Patient reports history of asthma and that her asthma is currently under control.  Discussed Symbicort use during lactation and to hold Symbicort if patient's asthma is under control while lactation.  Also counseled patient on seasonal allergy medications.    Patient denies any temperature intolerance or any GI symptoms.      Objective   Vital Signs:  /70 (BP Location: Left arm, Patient Position: Sitting, Cuff Size: Adult)   Pulse 68   Temp 98.6 °F (37 °C) (Temporal)   Resp 17   Ht 165.1 cm (65\")   Wt 83.3 kg (183 lb 9.6 oz)   SpO2 98%   BMI 30.55 kg/m²   Estimated body mass index is 30.55 kg/m² as calculated from the following:    Height as of this encounter: 165.1 cm (65\").    Weight as of this encounter: 83.3 kg (183 lb 9.6 oz).       BMI is >= 30 and <35. (Class 1 Obesity). The following options were offered after discussion;: exercise counseling/recommendations and nutrition counseling/recommendations      Physical Exam  Constitutional:       Appearance: Normal appearance.   HENT:      Head: Normocephalic and atraumatic.   Eyes:      Conjunctiva/sclera: Conjunctivae normal.   Neck:      Comments: Mild " thyromegaly  Cardiovascular:      Rate and Rhythm: Normal rate and regular rhythm.      Heart sounds: Normal heart sounds.   Pulmonary:      Effort: Pulmonary effort is normal.      Breath sounds: Normal breath sounds.   Abdominal:      General: Bowel sounds are normal.      Palpations: Abdomen is soft.      Comments: Non-tender   Musculoskeletal:      Comments: Left ingrown toenail great toe   Skin:     General: Skin is warm.      Comments: b/l hand maculopapular rash just on hands distil to the wrists.   Neurological:      General: No focal deficit present.      Mental Status: She is alert and oriented to person, place, and time.   Psychiatric:         Mood and Affect: Mood normal.         Behavior: Behavior normal.        Result Review :    The following data was reviewed by: MICHELLE Grant on 04/16/2024:  CMP          3/6/2024    19:10   CMP   Glucose 81    BUN 7    Creatinine 0.58    EGFR 125.8    Sodium 135    Potassium 3.8    Chloride 101    Calcium 8.4    Total Protein 6.6    Albumin 3.6    Globulin 3.0    Total Bilirubin 0.3    Alkaline Phosphatase 167    AST (SGOT) 16    ALT (SGPT) 12    Albumin/Globulin Ratio 1.2    BUN/Creatinine Ratio 12.1    Anion Gap 15.4      CBC          12/1/2023    12:27 3/6/2024    19:10 3/8/2024    06:28   CBC   WBC  12.05  12.27    RBC  4.73  4.15    Hemoglobin 11.9  12.9  11.4    Hematocrit 34.5  37.4  33.9    MCV  79.1  81.7    MCH  27.3  27.5    MCHC  34.5  33.6    RDW  12.5  12.4    Platelets  284  302              Data reviewed : Consultant notes reviewed OB discharge note from March 8, 2024 after normal vaginal delivery.             Assessment and Plan     Diagnoses and all orders for this visit:    1. Encounter to establish care (Primary)    2. Ingrown toenail of left foot  -     Ambulatory Referral to Podiatry    3. Irritant contact dermatitis, unspecified trigger  -     triamcinolone (KENALOG) 0.1 % cream; Apply 1 Application topically to the appropriate  area as directed 2 (Two) Times a Day. Apply twice daily for 2 weeks.  Do not apply to neck, face, axilla, groin areas for over 2 weeks.  Dispense: 30 g; Refill: 0    4. Lipid screening  -     Lipid Panel    5. Thyromegaly  Comments:  mild  Orders:  -     TSH  -     T4, free    6. Encounter for preventive health examination  -     Comprehensive Metabolic Panel    7. Mild intermittent asthma without complication  -     Ventolin  (90 Base) MCG/ACT inhaler; Inhale 2 puffs Every 6 (Six) Hours As Needed for Wheezing.  Dispense: 18 g; Refill: 6  -     budesonide-formoterol (SYMBICORT) 80-4.5 MCG/ACT inhaler; Inhale 2 puffs 2 (Two) Times a Day. Please rinse mouth with water after each use.  Dispense: 10.2 g; Refill: 5    8. Environmental and seasonal allergies  -     cetirizine (zyrTEC) 10 MG tablet; Take 1 tablet by mouth Daily.  Dispense: 90 tablet; Refill: 3      All chronic conditions have been addressed and treated by the practice or other specialists. Medications have been reconciled and refilled as appropriate. Reiterated compliance and timely follow up appointments. Side effects of all new and old medications reviewed with the patient and patient willing to accept all risks involved. Advised RTO if no improvement or worsening of symptoms or if any new complaints arise. Patient advised to follow up with clinic or call after diagnostic tests, if patient does not hear from office 3 days after the test completion.            Follow Up     Return in about 6 months (around 10/16/2024) for Next scheduled follow up.  Patient was given instructions and counseling regarding her condition or for health maintenance advice. Please see specific information pulled into the AVS if appropriate.

## 2024-04-17 DIAGNOSIS — R74.8 ELEVATED LIVER ENZYMES: Primary | ICD-10-CM

## 2024-04-18 LAB
HBV CORE AB SERPL QL IA: NEGATIVE
HBV SURFACE AB SER QL: NON REACTIVE
HBV SURFACE AG SERPL QL IA: NEGATIVE
HCV IGG SERPL QL IA: NON REACTIVE
IMP & REVIEW OF LAB RESULTS: NORMAL
LABORATORY COMMENT REPORT: NORMAL
WRITTEN AUTHORIZATION: NORMAL

## 2024-04-19 ENCOUNTER — POSTPARTUM VISIT (OUTPATIENT)
Dept: OBSTETRICS AND GYNECOLOGY | Facility: CLINIC | Age: 29
End: 2024-04-19
Payer: MEDICAID

## 2024-04-19 VITALS
HEIGHT: 65 IN | DIASTOLIC BLOOD PRESSURE: 79 MMHG | WEIGHT: 182 LBS | SYSTOLIC BLOOD PRESSURE: 114 MMHG | BODY MASS INDEX: 30.32 KG/M2 | HEART RATE: 78 BPM

## 2024-04-19 RX ORDER — ACETAMINOPHEN AND CODEINE PHOSPHATE 120; 12 MG/5ML; MG/5ML
1 SOLUTION ORAL DAILY
Qty: 28 TABLET | Refills: 12 | Status: SHIPPED | OUTPATIENT
Start: 2024-04-19 | End: 2025-04-19

## 2024-04-19 NOTE — PROGRESS NOTES
HPI   Kelly Stevens  is a 28 y.o. female who presents for a routine postpartum checkup.  Her baby is 6 weeks old.  The baby is doing well.  He is breast-feeding.  The patient is also feeling well.  She has followed with her primary care physician who did liver function tests and these were elevated.  Workup is ongoing with the primary care physician.  Bowels and bladder are functioning normally.  Postpartum bleeding has resolved.    Chief Complaint   Patient presents with    Postpartum Care       Past Medical History:   Diagnosis Date    Asthma     Wears glasses        Past Surgical History:   Procedure Laterality Date    NOSE SURGERY  2016    WISDOM TOOTH EXTRACTION  10/2019       Social History     Socioeconomic History    Marital status: Single   Tobacco Use    Smoking status: Never    Smokeless tobacco: Never   Vaping Use    Vaping status: Never Used   Substance and Sexual Activity    Alcohol use: No    Drug use: No    Sexual activity: Yes     Partners: Male     Birth control/protection: None       The following portions of the patient's history were reviewed and updated as appropriate: allergies, current medications, past family history, past medical history, past social history, past surgical history and problem list.    Review of Systems   Constitutional: Negative.    HENT: Negative.     Respiratory: Negative.     Cardiovascular: Negative.    Gastrointestinal: Negative.    Genitourinary: Negative.    Musculoskeletal: Negative.    Skin: Negative.    Allergic/Immunologic: Negative.    Psychiatric/Behavioral: Negative.               Physical Exam  Vitals and nursing note reviewed.   Constitutional:       Appearance: She is well-developed.   HENT:      Head: Normocephalic and atraumatic.   Cardiovascular:      Rate and Rhythm: Normal rate and regular rhythm.   Pulmonary:      Effort: Pulmonary effort is normal.      Breath sounds: Normal breath sounds. No wheezing or rales.   Chest:      Comments: The breasts  are homogeneous.  There are no palpable lumps.  Nipple discharge and axillary adenopathy are absent.  Abdominal:      General: There is no distension.      Palpations: Abdomen is soft.      Tenderness: There is no abdominal tenderness.   Genitourinary:     Labia:         Right: No lesion.         Left: No lesion.       Vagina: Normal. No vaginal discharge.      Cervix: No cervical motion tenderness.      Adnexa:         Right: No mass or tenderness.          Left: No mass or tenderness.     Skin:     General: Skin is warm and dry.   Neurological:      Mental Status: She is alert and oriented to person, place, and time.         Assessment    Diagnoses and all orders for this visit:    1. Routine postpartum follow-up (Primary)    Other orders  -     norethindrone (MICRONOR) 0.35 MG tablet; Take 1 tablet by mouth Daily.  Dispense: 28 tablet; Refill: 12        Plan  Appropriate progress, 6 weeks postpartum.  Okay to resume all normal activities of daily living  Contraceptive counseling.  The patient is breast-feeding and would like to use a progesterone only pill.  She understands that this is only 80% effective.  I recommended that she also add condoms.  Elevated liver function tests.  Workup is ongoing with the patient's primary care physician.    Return in about 6 months (around 10/19/2024).    Social History     Tobacco Use   Smoking Status Never   Smokeless Tobacco Never

## 2024-04-26 ENCOUNTER — OFFICE VISIT (OUTPATIENT)
Dept: FAMILY MEDICINE CLINIC | Facility: CLINIC | Age: 29
End: 2024-04-26
Payer: MEDICAID

## 2024-04-26 VITALS
OXYGEN SATURATION: 98 % | TEMPERATURE: 98.7 F | WEIGHT: 187.2 LBS | HEIGHT: 65 IN | DIASTOLIC BLOOD PRESSURE: 60 MMHG | BODY MASS INDEX: 31.19 KG/M2 | HEART RATE: 68 BPM | SYSTOLIC BLOOD PRESSURE: 110 MMHG

## 2024-04-26 DIAGNOSIS — Z00.00 ANNUAL PHYSICAL EXAM: Primary | ICD-10-CM

## 2024-04-26 DIAGNOSIS — M25.50 ARTHRALGIA, UNSPECIFIED JOINT: ICD-10-CM

## 2024-04-26 DIAGNOSIS — R41.3 MEMORY CHANGES: ICD-10-CM

## 2024-04-26 DIAGNOSIS — R74.8 ELEVATED LIVER ENZYMES: ICD-10-CM

## 2024-04-26 PROBLEM — Z13.220 LIPID SCREENING: Status: RESOLVED | Noted: 2024-04-16 | Resolved: 2024-04-26

## 2024-04-26 PROBLEM — Z76.89 ENCOUNTER TO ESTABLISH CARE: Status: RESOLVED | Noted: 2024-04-16 | Resolved: 2024-04-26

## 2024-04-26 RX ORDER — ELECTROLYTES/DEXTROSE
1 SOLUTION, ORAL ORAL DAILY
Qty: 90 TABLET | Refills: 3 | Status: SHIPPED | OUTPATIENT
Start: 2024-04-26 | End: 2025-04-21

## 2024-04-26 NOTE — ASSESSMENT & PLAN NOTE
Good afternoonAvoid Acetaminophen, NSAIDs, Alcohol; and also avoid other hepatotoxic agents or OTCs without prior consultation with Provider; patient voiced  understanding.

## 2024-04-26 NOTE — PROGRESS NOTES
"Chief Complaint  Follow-up    Subjective        Kelly Stevens presents to [facility] PRIMARY CARE  History of Present Illness  28-year-old white female who was seen recently for establishment of care visit is here for follow-up on lab results, annual physical, and other concerns including possible rheumatologic disorder.    Pt c/o foggy brain intermittently and also mild memory issues as trouble reading where she has to go back and re-read stuff as she forgets.  Pt also c/o 10 year hx of polyarthralgia that is b/l in both large and small joints.  Patient desires rheumatologic testing as she is concerned about it due to history of joint pain and deformity brain.    Discussed neurology referral for foggy brain, patient voiced understanding.  Also discussed RONDA screening test for joint pain.      Instructed patient to get the adult preventive immunization that are due at  the pharmacy, including - prevnar & Tdap.  Also discussed most recent lab results which included elevated liver enzymes.  Informed patient had a negative hepatitis panel.  Discussed liver ultrasound and probably FibroSure test but patient to return to clinic for fasting labs.      Objective   Vital Signs:  /60 (BP Location: Left arm, Patient Position: Sitting, Cuff Size: Adult)   Pulse 68   Temp 98.7 °F (37.1 °C) (Temporal)   Ht 165.1 cm (65\")   Wt 84.9 kg (187 lb 3.2 oz)   SpO2 98%   BMI 31.15 kg/m²   Estimated body mass index is 31.15 kg/m² as calculated from the following:    Height as of this encounter: 165.1 cm (65\").    Weight as of this encounter: 84.9 kg (187 lb 3.2 oz).               Physical Exam  Constitutional:       Appearance: Normal appearance.   HENT:      Head: Normocephalic and atraumatic.   Eyes:      Conjunctiva/sclera: Conjunctivae normal.   Cardiovascular:      Rate and Rhythm: Normal rate and regular rhythm.      Heart sounds: Normal heart sounds.   Pulmonary:      Effort: Pulmonary effort is " normal.      Breath sounds: Normal breath sounds.   Abdominal:      General: Bowel sounds are normal. There is no distension.      Palpations: Abdomen is soft. There is no mass.      Tenderness: There is no abdominal tenderness. There is no guarding or rebound.      Hernia: No hernia is present.      Comments: Non-tender   Skin:     General: Skin is warm.   Neurological:      General: No focal deficit present.      Mental Status: She is alert and oriented to person, place, and time.   Psychiatric:         Mood and Affect: Mood normal.         Behavior: Behavior normal.        Result Review :    The following data was reviewed by: MICHELLE Grant on 04/26/2024:  CMP          3/6/2024    19:10 4/16/2024    09:22   CMP   Glucose 81  83    BUN 7  15    Creatinine 0.58  0.79    EGFR 125.8     Sodium 135  138    Potassium 3.8  4.1    Chloride 101  101    Calcium 8.4  9.7    Total Protein  6.8    Total Protein 6.6     Albumin 3.6  4.5    Globulin  2.3    Globulin 3.0     Total Bilirubin 0.3  <0.2    Alkaline Phosphatase 167  135    AST (SGOT) 16  63    ALT (SGPT) 12  94    Albumin/Globulin Ratio 1.2     BUN/Creatinine Ratio 12.1  19.0    Anion Gap 15.4       CBC          12/1/2023    12:27 3/6/2024    19:10 3/8/2024    06:28   CBC   WBC  12.05  12.27    RBC  4.73  4.15    Hemoglobin 11.9  12.9  11.4    Hematocrit 34.5  37.4  33.9    MCV  79.1  81.7    MCH  27.3  27.5    MCHC  34.5  33.6    RDW  12.5  12.4    Platelets  284  302      Lipid Panel          4/16/2024    09:22   Lipid Panel   Total Cholesterol 180    Triglycerides 45    HDL Cholesterol 94    VLDL Cholesterol 9    LDL Cholesterol  77      TSH          4/16/2024    09:22   TSH   TSH 0.802                       Assessment and Plan     Diagnoses and all orders for this visit:    1. Annual physical exam (Primary)  Assessment & Plan:  Counseling was provided on nutrition, physical activity, development, and injury prevention, dental health, and safe sex  practices patient verbalizes understanding no additional questions were asked.           2. Elevated liver enzymes  Assessment & Plan:  Good afternoonAvoid Acetaminophen, NSAIDs, Alcohol; and also avoid other hepatotoxic agents or OTCs without prior consultation with Provider; patient voiced  understanding.      Orders:  -     Cancel: MEZA Fibrosure; Future  -     US Abdomen Complete; Future  -     RONDA; Future  -     MEZA Fibrosure; Future    3. Arthralgia, unspecified joint  -     RONDA; Future    4. Memory changes  -     Ambulatory Referral to Neurology  -     multivitamin with minerals (Multivitamin Adult) tablet tablet; Take 1 tablet by mouth Daily for 360 days.  Dispense: 90 tablet; Refill: 3  -     Vitamin B12        All chronic conditions have been addressed and treated by the practice or other specialists. Medications have been reconciled and refilled as appropriate. Reiterated compliance and timely follow up appointments. Side effects of all new and old medications reviewed with the patient and patient willing to accept all risks involved. Advised RTO if no improvement or worsening of symptoms or if any new complaints arise. Patient advised to follow up with clinic or call after diagnostic tests, if patient does not hear from office 3 days after the test completion.         Follow Up     Return in about 3 months (around 7/26/2024) for Next scheduled follow up.  Patient was given instructions and counseling regarding her condition or for health maintenance advice. Please see specific information pulled into the AVS if appropriate.

## 2024-05-01 LAB
A2 MACROGLOB SERPL-MCNC: 231 MG/DL (ref 110–276)
ALT SERPL W P-5'-P-CCNC: 139 IU/L (ref 0–40)
ANA SER QL: NEGATIVE
APO A-I SERPL-MCNC: 186 MG/DL (ref 116–209)
AST SERPL W P-5'-P-CCNC: 79 IU/L (ref 0–40)
BILIRUB SERPL-MCNC: 0.2 MG/DL (ref 0–1.2)
CHOLEST SERPL-MCNC: 188 MG/DL (ref 100–199)
FIBROSIS SCORING:: ABNORMAL
FIBROSIS STAGE SERPL QL: ABNORMAL
GGT SERPL-CCNC: 81 IU/L (ref 0–60)
GLUCOSE SERPL-MCNC: 94 MG/DL (ref 70–99)
HAPTOGLOB SERPL-MCNC: 234 MG/DL (ref 33–278)
HBV CORE AB SERPL QL IA: NEGATIVE
HBV SURFACE AB SER QL: NON REACTIVE
HBV SURFACE AG SERPL QL IA: NEGATIVE
HCV IGG SERPL QL IA: NON REACTIVE
LABORATORY COMMENT REPORT: ABNORMAL
LIVER FIBR SCORE SERPL CALC.FIBROSURE: 0.05 (ref 0–0.21)
LIVER STEATOSIS GRADE SERPL QL: ABNORMAL
LIVER STEATOSIS SCORE SERPL: 0.39 (ref 0–0.4)
NASH GRADE SERPL QL: ABNORMAL
NASH INTERPRETATION SERPL-IMP: ABNORMAL
NASH SCORE SERPL: 0 (ref 0–0.25)
NASH SCORING: ABNORMAL
STEATOSIS SCORING: ABNORMAL
TEST PERFORMANCE INFO SPEC: ABNORMAL
TEST PERFORMANCE INFO SPEC: ABNORMAL
TRIGL SERPL-MCNC: 55 MG/DL (ref 0–149)
VIT B12 SERPL-MCNC: 624 PG/ML (ref 232–1245)

## 2024-05-13 ENCOUNTER — HOSPITAL ENCOUNTER (OUTPATIENT)
Dept: ULTRASOUND IMAGING | Facility: HOSPITAL | Age: 29
Discharge: HOME | End: 2024-05-13
Admitting: STUDENT IN AN ORGANIZED HEALTH CARE EDUCATION/TRAINING PROGRAM
Payer: MEDICAID

## 2024-05-13 DIAGNOSIS — R74.8 ELEVATED LIVER ENZYMES: ICD-10-CM

## 2024-05-13 PROCEDURE — 76700 US EXAM ABDOM COMPLETE: CPT

## 2024-08-21 ENCOUNTER — OFFICE VISIT (OUTPATIENT)
Dept: NEUROLOGY | Facility: CLINIC | Age: 29
End: 2024-08-21
Payer: MEDICAID

## 2024-08-21 VITALS
BODY MASS INDEX: 29.99 KG/M2 | OXYGEN SATURATION: 98 % | SYSTOLIC BLOOD PRESSURE: 128 MMHG | HEIGHT: 65 IN | HEART RATE: 72 BPM | WEIGHT: 180 LBS | DIASTOLIC BLOOD PRESSURE: 74 MMHG

## 2024-08-21 DIAGNOSIS — R41.3 MEMORY LOSS: ICD-10-CM

## 2024-08-21 DIAGNOSIS — G43.709 CHRONIC MIGRAINE WITHOUT AURA WITHOUT STATUS MIGRAINOSUS, NOT INTRACTABLE: Primary | ICD-10-CM

## 2024-08-21 DIAGNOSIS — R42 DIZZINESS: ICD-10-CM

## 2024-08-21 DIAGNOSIS — R20.2 TINGLING: ICD-10-CM

## 2024-08-21 PROBLEM — G43.009 MIGRAINE WITHOUT AURA AND WITHOUT STATUS MIGRAINOSUS, NOT INTRACTABLE: Status: ACTIVE | Noted: 2021-11-03

## 2024-08-21 PROBLEM — J45.909 ASTHMA: Status: ACTIVE | Noted: 2021-07-14

## 2024-08-21 RX ORDER — IPRATROPIUM BROMIDE AND ALBUTEROL SULFATE 2.5; .5 MG/3ML; MG/3ML
3 SOLUTION RESPIRATORY (INHALATION)
COMMUNITY
Start: 2024-08-09

## 2024-08-21 RX ORDER — RIMEGEPANT SULFATE 75 MG/75MG
75 TABLET, ORALLY DISINTEGRATING ORAL EVERY OTHER DAY
Qty: 16 TABLET | Refills: 2 | Status: SHIPPED | OUTPATIENT
Start: 2024-08-21 | End: 2024-11-19

## 2024-08-21 RX ORDER — METHYLPREDNISOLONE 4 MG/1
TABLET ORAL
COMMUNITY
Start: 2024-08-14

## 2024-08-21 RX ORDER — PREDNISONE 50 MG/1
TABLET ORAL
COMMUNITY
Start: 2024-08-09
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
## 2024-08-22 ENCOUNTER — PATIENT ROUNDING (BHMG ONLY) (OUTPATIENT)
Dept: NEUROLOGY | Facility: CLINIC | Age: 29
End: 2024-08-22
Payer: MEDICAID

## 2024-09-17 ENCOUNTER — TELEPHONE (OUTPATIENT)
Dept: FAMILY MEDICINE CLINIC | Facility: CLINIC | Age: 29
End: 2024-09-17

## 2024-09-17 NOTE — TELEPHONE ENCOUNTER
Caller: Kelly Stevens    Relationship: Self    Best call back number: [phone]     What is the medical concern/diagnosis: NECK AND BACK PAIN    What specialty or service is being requested: CHIROPRACTOR    What is the provider, practice or medical service name: [facility]    What is the office location: [address] [facility]    What is the office phone number: [phone]    Any additional details: INSURANCE REQUIRES A REFERRAL FROM PCP

## 2024-09-17 NOTE — TELEPHONE ENCOUNTER
Called patient, was unable to leave a VM. Patient needs appt to discuss referral request. Hub ok to read.    81

## 2024-09-24 ENCOUNTER — TELEPHONE (OUTPATIENT)
Dept: FAMILY MEDICINE CLINIC | Facility: CLINIC | Age: 29
End: 2024-09-24
Payer: MEDICAID

## 2024-09-30 DIAGNOSIS — G43.709 CHRONIC MIGRAINE WITHOUT AURA WITHOUT STATUS MIGRAINOSUS, NOT INTRACTABLE: ICD-10-CM

## 2024-09-30 DIAGNOSIS — R41.3 MEMORY LOSS: ICD-10-CM

## 2024-10-10 ENCOUNTER — TELEPHONE (OUTPATIENT)
Dept: NEUROLOGY | Facility: CLINIC | Age: 29
End: 2024-10-10
Payer: MEDICAID

## 2024-10-10 NOTE — TELEPHONE ENCOUNTER
Patient is scheduled on 10/16/2024 for a follow up, patient needs to have Mri completed prior to appointment. Attempted to reach the patient to see if she had it done at another facility. Left message for the patient to return our call. Hub okay to reschedule and give the patient scheduling's number.

## 2024-10-11 ENCOUNTER — OFFICE VISIT (OUTPATIENT)
Dept: OBSTETRICS AND GYNECOLOGY | Facility: CLINIC | Age: 29
End: 2024-10-11
Payer: MEDICAID

## 2024-10-11 VITALS — DIASTOLIC BLOOD PRESSURE: 80 MMHG | WEIGHT: 184 LBS | BODY MASS INDEX: 30.62 KG/M2 | SYSTOLIC BLOOD PRESSURE: 120 MMHG

## 2024-10-11 DIAGNOSIS — Z00.00 ANNUAL PHYSICAL EXAM: Primary | ICD-10-CM

## 2024-10-11 RX ORDER — ACETAMINOPHEN AND CODEINE PHOSPHATE 120; 12 MG/5ML; MG/5ML
1 SOLUTION ORAL DAILY
Qty: 90 TABLET | Refills: 3 | Status: SHIPPED | OUTPATIENT
Start: 2024-10-11 | End: 2025-10-11

## 2024-10-11 NOTE — PROGRESS NOTES
EDWIN Stevens  is a 28 y.o. female who presents for a routine gynecologic exam.  Overall, she is feeling well.  Bowels and bladder are functioning normally.  The patient is breast-feeding her baby.  The baby is doing well.  Patient is using a progesterone only birth control and she is satisfied with this.    Chief Complaint   Patient presents with    Annual Exam     No abnormal paps       Past Medical History:   Diagnosis Date    Asthma     Cluster headache     Encounter for preventive health examination 04/16/2024    Encounter to establish care 04/16/2024    Headache, tension-type     Memory loss     Migraine     Wears glasses        Past Surgical History:   Procedure Laterality Date    NOSE SURGERY  2016    WISDOM TOOTH EXTRACTION  10/2019       Social History     Socioeconomic History    Marital status: Single   Tobacco Use    Smoking status: Never    Smokeless tobacco: Never   Vaping Use    Vaping status: Never Used   Substance and Sexual Activity    Alcohol use: No    Drug use: No    Sexual activity: Yes     Partners: Male     Birth control/protection: None       The following portions of the patient's history were reviewed and updated as appropriate: allergies, current medications, past family history, past medical history, past social history, past surgical history and problem list.    Review of Systems   Constitutional: Negative.    HENT: Negative.     Respiratory: Negative.     Cardiovascular: Negative.    Gastrointestinal: Negative.    Genitourinary: Negative.    Musculoskeletal: Negative.    Skin: Negative.    Allergic/Immunologic: Negative.    Psychiatric/Behavioral: Negative.               Physical Exam  Vitals and nursing note reviewed.   Constitutional:       Appearance: She is well-developed.   HENT:      Head: Normocephalic and atraumatic.   Cardiovascular:      Rate and Rhythm: Normal rate and regular rhythm.   Pulmonary:      Effort: Pulmonary effort is normal.      Breath sounds: Normal  breath sounds. No wheezing or rales.   Chest:      Comments: The breasts are homogeneous.  There are no palpable lumps.  Nipple discharge and axillary adenopathy are absent.  Abdominal:      General: There is no distension.      Palpations: Abdomen is soft.      Tenderness: There is no abdominal tenderness.   Genitourinary:     Labia:         Right: No lesion.         Left: No lesion.       Vagina: Normal. No vaginal discharge.      Cervix: No cervical motion tenderness.      Uterus: Normal. Not enlarged and not tender.       Adnexa:         Right: No mass or tenderness.          Left: No mass or tenderness.     Skin:     General: Skin is warm and dry.   Neurological:      Mental Status: She is alert and oriented to person, place, and time.         Assessment    Diagnoses and all orders for this visit:    1. Annual physical exam (Primary)    Other orders  -     norethindrone (MICRONOR) 0.35 MG tablet; Take 1 tablet by mouth Daily.  Dispense: 90 tablet; Refill: 3        Plan  Annual examination performed  Contraceptive counseling.  The patient is using a progesterone only birth control pill while she is nursing her baby.  She understands that it is only 80% effective at preventing pregnancy.  She is satisfied with this method.  Refill has been sent.  Pap smear performed.    Return in about 1 year (around 10/11/2025).    Social History     Tobacco Use   Smoking Status Never   Smokeless Tobacco Never

## 2024-10-14 NOTE — TELEPHONE ENCOUNTER
Second attempt to reach patient regarding upcoming appointment and imaging. Hub okay to reschedule and give the patient scheduling's number.

## 2024-10-16 ENCOUNTER — OFFICE VISIT (OUTPATIENT)
Dept: FAMILY MEDICINE CLINIC | Facility: CLINIC | Age: 29
End: 2024-10-16
Payer: MEDICAID

## 2024-10-16 VITALS
OXYGEN SATURATION: 98 % | WEIGHT: 187.8 LBS | BODY MASS INDEX: 31.29 KG/M2 | DIASTOLIC BLOOD PRESSURE: 78 MMHG | RESPIRATION RATE: 16 BRPM | TEMPERATURE: 97.5 F | HEIGHT: 65 IN | SYSTOLIC BLOOD PRESSURE: 116 MMHG | HEART RATE: 89 BPM

## 2024-10-16 DIAGNOSIS — R74.8 ELEVATED LIVER ENZYMES: ICD-10-CM

## 2024-10-16 DIAGNOSIS — Z83.2 FAMILY HISTORY OF AUTOIMMUNE DISORDER: ICD-10-CM

## 2024-10-16 DIAGNOSIS — R41.3 MEMORY CHANGES: ICD-10-CM

## 2024-10-16 DIAGNOSIS — M25.50 POLYARTHRALGIA: Chronic | ICD-10-CM

## 2024-10-16 DIAGNOSIS — J45.20 MILD INTERMITTENT ASTHMA WITHOUT COMPLICATION: Chronic | ICD-10-CM

## 2024-10-16 DIAGNOSIS — G89.29 CHRONIC NECK PAIN: Chronic | ICD-10-CM

## 2024-10-16 DIAGNOSIS — M25.50 ARTHRALGIA, UNSPECIFIED JOINT: Primary | ICD-10-CM

## 2024-10-16 DIAGNOSIS — J30.89 ENVIRONMENTAL AND SEASONAL ALLERGIES: Chronic | ICD-10-CM

## 2024-10-16 DIAGNOSIS — Z23 IMMUNIZATION DUE: ICD-10-CM

## 2024-10-16 DIAGNOSIS — M54.50 CHRONIC MIDLINE LOW BACK PAIN WITHOUT SCIATICA: Chronic | ICD-10-CM

## 2024-10-16 DIAGNOSIS — M54.2 CHRONIC NECK PAIN: Chronic | ICD-10-CM

## 2024-10-16 DIAGNOSIS — G89.29 CHRONIC MIDLINE LOW BACK PAIN WITHOUT SCIATICA: Chronic | ICD-10-CM

## 2024-10-16 PROBLEM — Z00.00 ANNUAL PHYSICAL EXAM: Status: RESOLVED | Noted: 2024-04-26 | Resolved: 2024-10-16

## 2024-10-16 PROBLEM — L24.9 IRRITANT CONTACT DERMATITIS: Status: RESOLVED | Noted: 2024-04-16 | Resolved: 2024-10-16

## 2024-10-16 PROBLEM — L60.0 INGROWN TOENAIL OF LEFT FOOT: Status: RESOLVED | Noted: 2024-04-16 | Resolved: 2024-10-16

## 2024-10-16 LAB
CONV .: NORMAL
CYTOLOGIST CVX/VAG CYTO: NORMAL
CYTOLOGY CVX/VAG DOC CYTO: NORMAL
CYTOLOGY CVX/VAG DOC THIN PREP: NORMAL
DX ICD CODE: NORMAL
Lab: NORMAL
OTHER STN SPEC: NORMAL
SPECIMEN STATUS: NORMAL
STAT OF ADQ CVX/VAG CYTO-IMP: NORMAL

## 2024-10-16 PROCEDURE — 1126F AMNT PAIN NOTED NONE PRSNT: CPT | Performed by: STUDENT IN AN ORGANIZED HEALTH CARE EDUCATION/TRAINING PROGRAM

## 2024-10-16 PROCEDURE — 99214 OFFICE O/P EST MOD 30 MIN: CPT | Performed by: STUDENT IN AN ORGANIZED HEALTH CARE EDUCATION/TRAINING PROGRAM

## 2024-10-16 PROCEDURE — 1159F MED LIST DOCD IN RCRD: CPT | Performed by: STUDENT IN AN ORGANIZED HEALTH CARE EDUCATION/TRAINING PROGRAM

## 2024-10-16 PROCEDURE — 1160F RVW MEDS BY RX/DR IN RCRD: CPT | Performed by: STUDENT IN AN ORGANIZED HEALTH CARE EDUCATION/TRAINING PROGRAM

## 2024-10-16 RX ORDER — ALBUTEROL SULFATE 90 UG/1
2 INHALANT RESPIRATORY (INHALATION) EVERY 6 HOURS PRN
Qty: 8.7 G | Refills: 5 | Status: SHIPPED | OUTPATIENT
Start: 2024-10-16

## 2024-10-16 RX ORDER — ELECTROLYTES/DEXTROSE
1 SOLUTION, ORAL ORAL DAILY
Qty: 90 TABLET | Refills: 3 | Status: SHIPPED | OUTPATIENT
Start: 2024-10-16 | End: 2025-10-11

## 2024-10-16 RX ORDER — BUDESONIDE AND FORMOTEROL FUMARATE DIHYDRATE 80; 4.5 UG/1; UG/1
2 AEROSOL RESPIRATORY (INHALATION)
Qty: 10.2 G | Refills: 5 | Status: SHIPPED | OUTPATIENT
Start: 2024-10-16

## 2024-10-16 RX ORDER — CETIRIZINE HYDROCHLORIDE 10 MG/1
10 TABLET ORAL DAILY
Qty: 90 TABLET | Refills: 3 | Status: SHIPPED | OUTPATIENT
Start: 2024-10-16

## 2024-10-16 RX ORDER — FLUTICASONE PROPIONATE 50 UG/1
2 SPRAY, METERED NASAL DAILY
Qty: 16 G | Refills: 11 | Status: SHIPPED | OUTPATIENT
Start: 2024-10-16

## 2024-10-16 NOTE — ASSESSMENT & PLAN NOTE
Discussed x-rays and physical therapy for chronic neck and low back pain, patient voiced understanding.

## 2024-10-16 NOTE — ASSESSMENT & PLAN NOTE
Discussed rheumatology workup, patient voiced understanding.  Also referred to rheumatology per patient request.

## 2024-10-16 NOTE — ASSESSMENT & PLAN NOTE
X-ray requisition sheets provided to patient with instructions to go to [facility] to complete the x-rays.
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
## 2024-10-17 LAB
A1AT SERPL-MCNC: 140 MG/DL (ref 100–188)
ALBUMIN SERPL-MCNC: 4.4 G/DL (ref 4–5)
ALP SERPL-CCNC: 103 IU/L (ref 44–121)
ALT SERPL-CCNC: 23 IU/L (ref 0–32)
ANA SER QL: NEGATIVE
AST SERPL-CCNC: 25 IU/L (ref 0–40)
BILIRUB SERPL-MCNC: 0.3 MG/DL (ref 0–1.2)
BUN SERPL-MCNC: 10 MG/DL (ref 6–20)
BUN/CREAT SERPL: 14 (ref 9–23)
CALCIUM SERPL-MCNC: 9.3 MG/DL (ref 8.7–10.2)
CCP IGA+IGG SERPL IA-ACNC: 7 UNITS (ref 0–19)
CERULOPLASMIN SERPL-MCNC: 22.3 MG/DL (ref 19–39)
CHLORIDE SERPL-SCNC: 102 MMOL/L (ref 96–106)
CO2 SERPL-SCNC: 22 MMOL/L (ref 20–29)
CREAT SERPL-MCNC: 0.73 MG/DL (ref 0.57–1)
CRP SERPL-MCNC: <1 MG/L (ref 0–10)
DSDNA AB SER-ACNC: <1 IU/ML (ref 0–9)
EGFRCR SERPLBLD CKD-EPI 2021: 114 ML/MIN/1.73
ERYTHROCYTE [SEDIMENTATION RATE] IN BLOOD BY WESTERGREN METHOD: 5 MM/HR (ref 0–32)
FERRITIN SERPL-MCNC: 16 NG/ML (ref 15–150)
GGT SERPL-CCNC: 30 IU/L (ref 0–60)
GLOBULIN SER CALC-MCNC: 2.3 G/DL (ref 1.5–4.5)
GLUCOSE SERPL-MCNC: 82 MG/DL (ref 70–99)
MITOCHONDRIA M2 IGG SER-ACNC: <20 UNITS (ref 0–20)
POTASSIUM SERPL-SCNC: 4.6 MMOL/L (ref 3.5–5.2)
PROT SERPL-MCNC: 6.7 G/DL (ref 6–8.5)
RHEUMATOID FACT SERPL-ACNC: <10 IU/ML
SMA IGG SER-ACNC: 5 UNITS (ref 0–19)
SODIUM SERPL-SCNC: 141 MMOL/L (ref 134–144)
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
## 2024-10-28 ENCOUNTER — OFFICE VISIT (OUTPATIENT)
Age: 29
End: 2024-10-28
Payer: MEDICAID

## 2024-10-28 VITALS
HEIGHT: 65 IN | BODY MASS INDEX: 31.09 KG/M2 | WEIGHT: 186.6 LBS | SYSTOLIC BLOOD PRESSURE: 116 MMHG | HEART RATE: 81 BPM | DIASTOLIC BLOOD PRESSURE: 70 MMHG | TEMPERATURE: 98.3 F

## 2024-10-28 DIAGNOSIS — M79.7 FIBROMYALGIA: Primary | ICD-10-CM

## 2024-10-28 PROCEDURE — 1160F RVW MEDS BY RX/DR IN RCRD: CPT | Performed by: STUDENT IN AN ORGANIZED HEALTH CARE EDUCATION/TRAINING PROGRAM

## 2024-10-28 PROCEDURE — 1159F MED LIST DOCD IN RCRD: CPT | Performed by: STUDENT IN AN ORGANIZED HEALTH CARE EDUCATION/TRAINING PROGRAM

## 2024-10-28 PROCEDURE — 99204 OFFICE O/P NEW MOD 45 MIN: CPT | Performed by: STUDENT IN AN ORGANIZED HEALTH CARE EDUCATION/TRAINING PROGRAM

## 2024-10-29 PROBLEM — M79.7 FIBROMYALGIA: Status: ACTIVE | Noted: 2024-10-29

## 2024-10-29 NOTE — PATIENT INSTRUCTIONS
It was very nice to see you today.     Here is what we discussed:  That you do not have an ongoing autoimmune disease  With your history and exam is consistent with fibromyalgia  We discussed in brief what fibromyalgia means and I have provided additional information below for you to read.  Your primary care will be the best resource to guide you to the different modalities for management of this condition  No additional testing is needed.    PS: please see additional patient information on fibromyalgia    Generalized musculoskeletal pain, fatigue, poor quality sleep, cognitive symptoms, trigger points, and the absence of certain clinical findings such as skin rash, oral ulcers, or synovitis suggest fibromyalgia.     We discussed the concept that fibromyalgia is a pain syndrome, not a rheumatologic disease, characterized by pain amplification caused by underlying sleep disturbance, anxiety, depression and/or stress.   Treatment is focused on addressing these underlying issues.  ??  Physical activity?  Gradual program of aerobic exercise is the most important aspect of fibromyalgia treatment. Aerobic exercise may include walking, jogging, running, cycling, swimming or using an elliptical machine. I advised her to “start low and go slow”. An example could be to initiate 15 minutes of aerobic exercise 3 times a week for one month and then slowly increase the duration of the exercise sessions (e.g., 30 minutes 3 times/week for another month, 45 minutes 3 times/week for another month, etc). The goal is to eventually exercise at least 60 minutes 3 times per week. High intensity exercising should be avoided because it will most likely lead to discontinuation due to musculoskeletal pain and fatigue.   Other non-pharmacologic measures that she may consider are Larry Chi, Yoga and cognitive behavioral therapy.?  ?  Medications?  There are several agents can be tried in fibromyalgia.  You can discuss with your primary care and  decide on an appropriate choice.  These medications include nortriptyline (Pamelor), amitriptyline (Elavil), gabapentin (Neurontin), pregabalin (Lyrica), venlafaxine (Effexor), milnacipran (Savella), and duloxetine (Cymbalta).  ? Medications for fibromyalgia have a slow onset of action and commonly require 6-8 weeks on the target dose.    ?  Addressing coexistent comorbidities such as anxiety, depression, or insomnia may also help reducing the symptoms of fibromyalgia.?    Useful information for patients with fibromyalgia can be found at the [facility] FibroGuide: [email or web address]    Other mind-body management resources:   - [facility] for Mindfulness: [facility]/cfm/mbsr  - [facility] Mindful Awareness [facility]: [email or web address]   - [facility] for [facility] Medicine: [email or web address]  ?  You will continue to follow with your PCP, to whom we defer primary management of this diagnosis, including medications.

## 2024-10-29 NOTE — ASSESSMENT & PLAN NOTE
Evaluation of patient history, physical exam and results of her autoimmune labs are not supportive of an underlying autoimmune or inflammatory rheumatological condition that could explain her longstanding chronic manifestations.    Supervised administration clinical fibromyalgia diagnostic criteria patient questionnaire [[email or web address].zone/uploads/pdf/[email or web address].pdf] during this encounter indicates widespread pain index (WPI) score of 15 system severity score (SS score) of 9 and 17 for parts A and B respectively.  The findings of a WPI score greater then 7 and the SS score (parts 2A and B) is greater than 5 is diagnostic for fibromyalgia.  This was objectively supported by a normal physical exam other than findings of diffuse tender points in the shoulders, neck, clavicle, wrist, hips, lower back, ankles.    I have discussed my impression with patient and educated her on fibromyalgia, including providing resources to refer to.    She will not need further lab or imaging testing and there is no indication for any DMARDs or immunosuppressive.    She understands that her primary care provider will assist with coordinating  a multidisciplinary approach, combining pharmacological treatments with physical activity, psychological therapies, and patient education to achieve the best outcomes for patients with fibromyalgia.    She does not need longitudinal follow-up with rheumatology.

## 2024-11-06 ENCOUNTER — HOSPITAL ENCOUNTER (OUTPATIENT)
Dept: PHYSICAL THERAPY | Facility: HOSPITAL | Age: 29
Setting detail: THERAPIES SERIES
Discharge: HOME | End: 2024-11-06
Payer: MEDICAID

## 2024-11-06 DIAGNOSIS — G89.29 CHRONIC BILATERAL LOW BACK PAIN WITHOUT SCIATICA: ICD-10-CM

## 2024-11-06 DIAGNOSIS — M54.50 CHRONIC BILATERAL LOW BACK PAIN WITHOUT SCIATICA: ICD-10-CM

## 2024-11-06 DIAGNOSIS — M54.2 NECK PAIN: Primary | ICD-10-CM

## 2024-11-06 PROCEDURE — 97162 PT EVAL MOD COMPLEX 30 MIN: CPT
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
## 2024-11-13 ENCOUNTER — PRIOR AUTHORIZATION (OUTPATIENT)
Dept: NEUROLOGY | Facility: CLINIC | Age: 29
End: 2024-11-13
Payer: MEDICAID

## 2024-11-13 NOTE — TELEPHONE ENCOUNTER
[facility], Patient needs to be seen before we can proceed.

## 2024-11-19 ENCOUNTER — HOSPITAL ENCOUNTER (OUTPATIENT)
Dept: PHYSICAL THERAPY | Facility: HOSPITAL | Age: 29
Setting detail: THERAPIES SERIES
Discharge: HOME | End: 2024-11-19
Payer: MEDICAID

## 2024-11-19 DIAGNOSIS — G89.29 CHRONIC NECK PAIN: Chronic | ICD-10-CM

## 2024-11-19 DIAGNOSIS — M54.50 CHRONIC BILATERAL LOW BACK PAIN WITHOUT SCIATICA: ICD-10-CM

## 2024-11-19 DIAGNOSIS — M54.2 CHRONIC NECK PAIN: Chronic | ICD-10-CM

## 2024-11-19 DIAGNOSIS — M54.2 NECK PAIN: Primary | ICD-10-CM

## 2024-11-19 DIAGNOSIS — G89.29 CHRONIC BILATERAL LOW BACK PAIN WITHOUT SCIATICA: ICD-10-CM

## 2024-11-19 PROCEDURE — 97110 THERAPEUTIC EXERCISES: CPT

## 2024-11-19 NOTE — THERAPY TREATMENT NOTE
Outpatient Physical Therapy Ortho Treatment Note  [facility]     Patient Name: Kelly Stevens  : 1995  MRN: [id number]  Today's Date: 2024      Visit Date: 2024    Visit Dx:    ICD-10-CM ICD-9-CM   1. Neck pain  M54.2 723.1   2. Chronic bilateral low back pain without sciatica  M54.50 724.2    G89.29 338.29   3. Chronic neck pain  M54.2 723.1    G89.29 338.29       Patient Active Problem List   Diagnosis     (normal spontaneous vaginal delivery)    Mild intermittent asthma without complication    Thyromegaly    Environmental and seasonal allergies    Elevated liver enzymes    Arthralgia    Memory changes    Asthma    Chronic nasal congestion    Migraine without aura and without status migrainosus, not intractable    Family history of autoimmune disorder    Immunization due    Polyarthralgia    Chronic midline low back pain without sciatica    Chronic neck pain    Fibromyalgia        Past Medical History:   Diagnosis Date    Asthma     Cluster headache     Encounter for preventive health examination 2024    Encounter to establish care 2024    Headache, tension-type     Ingrown toenail of left foot 2024    Memory loss     Migraine     Wears glasses         Past Surgical History:   Procedure Laterality Date    NOSE SURGERY  2016    WISDOM TOOTH EXTRACTION  10/2019                        PT Assessment/Plan       Row Name 24 1600          PT Assessment    Assessment Comments Ms. Stevens returns to PT for first f/u after initial evaluation for cervical > LBP with reports of no change in symptoms. Started session 12 mins late and initiated initial exercise routine address dorsal scapular/postural strengthening, UT/LS muscle tension, BLE posterior chain muscle tightness and glute/hip weakness. Patient denies improvement in symptoms at end of session. Updated HEP, reviewed changes with patient and provided handout. Kelly Stevens remains a candidate for skilled  PT.  -JS        PT Plan    PT Plan Comments Response to last session, warm up on nustep with UE/LE, consider gentle shoulder row, supine HA, STS with TA, SL thoracic rotation, STM to UT/LS  -JS               User Key  (r) = Recorded By, (t) = Taken By, (c) = Cosigned By      Initials Name Provider Type    JS Jeanie Wheatley, PT Physical Therapist                       OP Exercises       Row Name 11/19/24 1400             Subjective    Subjective Comments Things are about the same since the eval  -JS         Subjective Pain    Able to rate subjective pain? yes  -JS      Pre-Treatment Pain Level 7  -JS      Post-Treatment Pain Level 7  -JS         Total Minutes    18228 - PT Therapeutic Exercise Minutes 30  -JS         Exercise 1    Exercise Name 1 NuStep  -JS      Additional Comments held due to time  -JS         Exercise 2    Exercise Name 2 Chin tucks  -JS      Cueing 2 Verbal;Demo  -JS      Sets 2 1  -JS      Reps 2 10  -JS      Time 2 5s  -JS         Exercise 3    Exercise Name 3 Chin tucks w/ rotation  -JS      Cueing 3 Verbal;Demo  -JS      Sets 3 1  -JS      Reps 3 10  -JS      Time 3 5s  -JS         Exercise 4    Exercise Name 4 supine piriformis stretch  -JS      Cueing 4 Verbal;Tactile  -JS      Reps 4 3  -JS      Time 4 20s  -JS         Exercise 5    Exercise Name 5 LTRs  -JS      Cueing 5 Verbal;Demo  -JS      Sets 5 1  -JS      Reps 5 10  -JS      Time 5 5s  -JS         Exercise 6    Exercise Name 6 supine PPT  -JS      Cueing 6 Verbal;Tactile  -JS      Sets 6 1  -JS      Reps 6 15  -JS      Time 6 5s  -JS         Exercise 7    Exercise Name 7 beginner bridge + PPT  -JS      Cueing 7 Verbal  -JS      Sets 7 2  -JS      Reps 7 10  -JS         Exercise 8    Exercise Name 8 HL hip abd  -JS      Cueing 8 Verbal;Tactile  -JS      Sets 8 1  -JS      Reps 8 15  -JS      Time 8 RTB  -JS      Additional Comments with TA  -JS         Exercise 9    Exercise Name 9 seated UT/LS stretch  -JS      Cueing 9  Verbal;Demo  -JS      Reps 9 3  -JS      Time 9 20s  -JS         Exercise 10    Exercise Name 10 seated scapular retraction  -JS      Cueing 10 Verbal;Demo  -JS      Sets 10 2  -JS      Reps 10 10  -JS                User Key  (r) = Recorded By, (t) = Taken By, (c) = Cosigned By      Initials Name Provider Type    Jeanie Montero, PT Physical Therapist                                  PT OP Goals       Row Name 11/19/24 1600          PT Short Term Goals    STG Date to Achieve 12/06/24  -JS     STG 1 Pt will be independent with initial HEP and postural awareness to improve pain and symptoms.  -JS     STG 1 Progress Ongoing  -JS     STG 2 Pt will improve cervical to 55 degrees bilaterally to improve ability to drive safely.  -JS     STG 2 Progress Ongoing  -JS     STG 3 Pt will show improved postural awareness with appropriate form during functional activities to assist with picking up her kids.  -JS     STG 3 Progress Ongoing  -JS        Long Term Goals    LTG Date to Achieve 01/05/25  -JS     LTG 1 Pt will be independent with advance HEP and postural awareness for continued management of pain and symptoms.  -JS     LTG 1 Progress Ongoing  -JS     LTG 2 Pt will improve NDI perceived disability from 35/50 to <22/50 to improve overall quality of life.  -JS     LTG 2 Progress Ongoing  -JS     LTG 3 Pt will improve LE strength to >4/5 bilaterally without any reports of referred headaches to improve functional ability  -JS     LTG 3 Progress Ongoing  -JS     LTG 4 Pt will decrease neck pain from 7/10 to </= 2/10 to improve participation in ADLs.  -     LTG 4 Progress Ongoing  -JS     LTG 5 Patient will improve walking tolerance to >30 min without LBP or cervical pain to improve participation in community activities.  -     LTG 5 Progress Ongoing  -JS               User Key  (r) = Recorded By, (t) = Taken By, (c) = Cosigned By      Initials Name Provider Type    Jeanie Montero, PT Physical Therapist                     Therapy Education  Education Details: updated HEP  Given: HEP, Symptoms/condition management, Pain management, Posture/body mechanics  Program: Reinforced, Progressed  How Provided: Verbal, Demonstration, Written  Provided to: Patient  Level of Understanding: Verbalized, Demonstrated              Time Calculation:   Start Time: 1457  Stop Time: 1527  Time Calculation (min): 30 min  Timed Charges  46342 - PT Therapeutic Exercise Minutes: 30  Total Minutes  Timed Charges Total Minutes: 30   Total Minutes: 30  Therapy Charges for Today       Code Description Service Date Service Provider Modifiers Qty    93557361327  PT THER PROC EA 15 MIN 11/19/2024 Jeanie Wheatley, PT GP 2                      Jeanie Wheatley, PT  11/19/2024

## 2024-11-20 ENCOUNTER — TELEPHONE (OUTPATIENT)
Dept: NEUROLOGY | Facility: CLINIC | Age: 29
End: 2024-11-20
Payer: MEDICAID

## 2024-11-20 NOTE — TELEPHONE ENCOUNTER
Called the patient regarding her appointment for Monday 11/25/2024. I informed her that her appointment needs to be rescheduled since she missed her MRI. The patient stated that she has claustrophobia and needs a prescription so that she can have the MRI. Her appointment for Monday has been cancelled and the patient has been advised that she needs to call centralized scheduling to reschedule and then call our office. Patient voiced understanding and had no questions.

## 2024-11-21 ENCOUNTER — HOSPITAL ENCOUNTER (OUTPATIENT)
Dept: PHYSICAL THERAPY | Facility: HOSPITAL | Age: 29
Setting detail: THERAPIES SERIES
Discharge: HOME | End: 2024-11-21
Payer: MEDICAID

## 2024-11-21 DIAGNOSIS — G89.29 CHRONIC BILATERAL LOW BACK PAIN WITHOUT SCIATICA: ICD-10-CM

## 2024-11-21 DIAGNOSIS — M54.2 CHRONIC NECK PAIN: ICD-10-CM

## 2024-11-21 DIAGNOSIS — M54.2 NECK PAIN: Primary | ICD-10-CM

## 2024-11-21 DIAGNOSIS — G89.29 CHRONIC NECK PAIN: ICD-10-CM

## 2024-11-21 DIAGNOSIS — M54.50 CHRONIC BILATERAL LOW BACK PAIN WITHOUT SCIATICA: ICD-10-CM

## 2024-11-21 PROCEDURE — 97110 THERAPEUTIC EXERCISES: CPT

## 2024-11-21 NOTE — THERAPY TREATMENT NOTE
Outpatient Physical Therapy Ortho Treatment Note  [facility]     Patient Name: Kelly Stevens  : 1995  MRN: [id number]  Today's Date: 2024      Visit Date: 2024    Visit Dx:    ICD-10-CM ICD-9-CM   1. Neck pain  M54.2 723.1   2. Chronic bilateral low back pain without sciatica  M54.50 724.2    G89.29 338.29   3. Chronic neck pain  M54.2 723.1    G89.29 338.29       Patient Active Problem List   Diagnosis     (normal spontaneous vaginal delivery)    Mild intermittent asthma without complication    Thyromegaly    Environmental and seasonal allergies    Elevated liver enzymes    Arthralgia    Memory changes    Asthma    Chronic nasal congestion    Migraine without aura and without status migrainosus, not intractable    Family history of autoimmune disorder    Immunization due    Polyarthralgia    Chronic midline low back pain without sciatica    Chronic neck pain    Fibromyalgia        Past Medical History:   Diagnosis Date    Asthma     Cluster headache     Encounter for preventive health examination 2024    Encounter to establish care 2024    Headache, tension-type     Ingrown toenail of left foot 2024    Memory loss     Migraine     Wears glasses         Past Surgical History:   Procedure Laterality Date    NOSE SURGERY  2016    WISDOM TOOTH EXTRACTION  10/2019                        PT Assessment/Plan       Row Name 24 1400          PT Assessment    Assessment Comments Kelly Stevens is a 28 year old female returning for physical therapy treatment session reporting no new complaints. She notes that her pain varies, and doesnt always effect one side vs the other. Today, her c/s is more painful than low back, however it changes. Added supine HA/ER with YTB and added RTB to scap retractions for additional strengthening challenge. Pt. performing a mix of exercisews for c/s and l/s, with no complaints or furher reports of pain. Pt. appears stiff in her t spine as  well and may benefit from midback mobility moving forward. Encouraged to continue with HEP, and she remains a good candidate for skilled PT.  -ER        PT Plan    PT Plan Comments This date pt. carrying child in carryer/car seat... consider discussing body mechanics/lifting to reduce load on spine when transferring child. Consider t/s mobs/PAs, s/l open book, low row/shoulder extension, pec doorway stretch, UT STM, STS with TrA  -ER               User Key  (r) = Recorded By, (t) = Taken By, (c) = Cosigned By      Initials Name Provider Type    ER Char Beck, PT Physical Therapist                       OP Exercises       Row Name 11/21/24 1400             Subjective    Subjective Comments Things are about the same. The pain changes sides  -ER         Subjective Pain    Able to rate subjective pain? yes  -ER      Pre-Treatment Pain Level 6  -ER      Post-Treatment Pain Level 6  -ER         Total Minutes    90498 - PT Therapeutic Exercise Minutes 40  -ER         Exercise 1    Exercise Name 1 NuStep  -ER      Cueing 1 Verbal  -ER      Time 1 5 minutes  -ER         Exercise 2    Exercise Name 2 Chin tucks  -ER      Cueing 2 Verbal;Demo  -ER      Sets 2 2  -ER      Reps 2 10  -ER      Time 2 5sec  -ER         Exercise 3    Exercise Name 3 Chin tucks w/ rotation  -ER      Cueing 3 Verbal;Demo  -ER      Sets 3 2  -ER      Reps 3 10  -ER      Time 3 5s  -ER         Exercise 4    Exercise Name 4 supine piriformis stretch  -ER      Cueing 4 Verbal;Tactile  -ER      Reps 4 3  -ER      Time 4 20s  -ER         Exercise 5    Exercise Name 5 LTRs  -ER      Cueing 5 Verbal;Demo  -ER      Sets 5 1  -ER      Reps 5 15  -ER      Time 5 5s  -ER         Exercise 6    Exercise Name 6 supine PPT  -ER      Cueing 6 Verbal;Tactile  -ER      Sets 6 1  -ER      Reps 6 15  -ER      Time 6 5s  -ER         Exercise 7    Exercise Name 7 beginner bridge + PPT  -ER      Cueing 7 Verbal  -ER      Sets 7 2  -ER      Reps 7 10  -ER      Time 7 3sec  at top  -ER         Exercise 8    Exercise Name 8 HL hip abd  -ER      Cueing 8 Verbal;Tactile  -ER      Sets 8 2  -ER      Reps 8 10e  -ER      Time 8 RTB  -ER      Additional Comments with TA  -ER         Exercise 9    Exercise Name 9 seated UT/LS stretch  -ER      Cueing 9 Verbal;Demo  -ER      Reps 9 3  -ER      Time 9 20s  -ER         Exercise 10    Exercise Name 10 standing scapular retraction w/ TB resistance  -ER      Cueing 10 Verbal;Demo  -ER      Sets 10 2  -ER      Reps 10 10  -ER      Time 10 RTB  -ER         Exercise 11    Exercise Name 11 Supine HA  -ER      Cueing 11 Verbal;Demo  -ER      Sets 11 2  -ER      Reps 11 10  -ER      Time 11 YTB  -ER         Exercise 12    Exercise Name 12 Supine ER  -ER      Cueing 12 Verbal;Demo  -ER      Reps 12 2x10  -ER      Time 12 YTB  -ER                User Key  (r) = Recorded By, (t) = Taken By, (c) = Cosigned By      Initials Name Provider Type    ER Char Beck, PT Physical Therapist                                  PT OP Goals       Row Name 11/21/24 1400          PT Short Term Goals    STG Date to Achieve 12/06/24  -ER     STG 1 Pt will be independent with initial HEP and postural awareness to improve pain and symptoms.  -ER     STG 1 Progress Ongoing  -ER     STG 2 Pt will improve cervical to 55 degrees bilaterally to improve ability to drive safely.  -ER     STG 2 Progress Ongoing  -ER     STG 3 Pt will show improved postural awareness with appropriate form during functional activities to assist with picking up her kids.  -ER     STG 3 Progress Ongoing  -ER        Long Term Goals    LTG Date to Achieve 01/05/25  -ER     LTG 1 Pt will be independent with advance HEP and postural awareness for continued management of pain and symptoms.  -ER     LTG 1 Progress Ongoing  -ER     LTG 2 Pt will improve NDI perceived disability from 35/50 to <22/50 to improve overall quality of life.  -ER     LTG 2 Progress Ongoing  -ER     LTG 3 Pt will improve LE strength to  >4/5 bilaterally without any reports of referred headaches to improve functional ability  -ER     LTG 3 Progress Ongoing  -ER     LTG 4 Pt will decrease neck pain from 7/10 to </= 2/10 to improve participation in ADLs.  -ER     LTG 4 Progress Ongoing  -ER     LTG 5 Patient will improve walking tolerance to >30 min without LBP or cervical pain to improve participation in community activities.  -ER     LTG 5 Progress Ongoing  -ER               User Key  (r) = Recorded By, (t) = Taken By, (c) = Cosigned By      Initials Name Provider Type    ER Char Beck, PT Physical Therapist                    Therapy Education  Given: HEP, Symptoms/condition management, Pain management, Posture/body mechanics  Program: Reinforced, Progressed  How Provided: Verbal, Demonstration, Written  Provided to: Patient  Level of Understanding: Verbalized, Demonstrated              Time Calculation:   Start Time: 1415  Stop Time: 1455  Time Calculation (min): 40 min  Total Timed Code Minutes- PT: 40 minute(s)  Timed Charges  44782 - PT Therapeutic Exercise Minutes: 40  Total Minutes  Timed Charges Total Minutes: 40   Total Minutes: 40  Therapy Charges for Today       Code Description Service Date Service Provider Modifiers Qty    62618920865 HC PT THER PROC EA 15 MIN 11/21/2024 Char Beck, PT GP 3                      Char Beck PT  11/21/2024

## 2024-11-26 ENCOUNTER — APPOINTMENT (OUTPATIENT)
Dept: PHYSICAL THERAPY | Facility: HOSPITAL | Age: 29
End: 2024-11-26
Payer: MEDICAID

## 2024-12-02 ENCOUNTER — HOSPITAL ENCOUNTER (OUTPATIENT)
Dept: PHYSICAL THERAPY | Facility: HOSPITAL | Age: 29
Setting detail: THERAPIES SERIES
Discharge: HOME | End: 2024-12-02
Payer: MEDICAID

## 2024-12-02 DIAGNOSIS — M54.50 CHRONIC BILATERAL LOW BACK PAIN WITHOUT SCIATICA: ICD-10-CM

## 2024-12-02 DIAGNOSIS — G89.29 CHRONIC NECK PAIN: ICD-10-CM

## 2024-12-02 DIAGNOSIS — M54.2 CHRONIC NECK PAIN: ICD-10-CM

## 2024-12-02 DIAGNOSIS — G89.29 CHRONIC BILATERAL LOW BACK PAIN WITHOUT SCIATICA: ICD-10-CM

## 2024-12-02 DIAGNOSIS — M54.2 NECK PAIN: Primary | ICD-10-CM

## 2024-12-02 PROCEDURE — 97110 THERAPEUTIC EXERCISES: CPT

## 2024-12-02 PROCEDURE — 97140 MANUAL THERAPY 1/> REGIONS: CPT

## 2024-12-02 NOTE — THERAPY TREATMENT NOTE
Outpatient Physical Therapy Ortho Treatment Note  [facility]     Patient Name: Kelly Stevens  : 1995  MRN: [id number]  Today's Date: 2024      Visit Date: 2024    Visit Dx:    ICD-10-CM ICD-9-CM   1. Neck pain  M54.2 723.1   2. Chronic bilateral low back pain without sciatica  M54.50 724.2    G89.29 338.29   3. Chronic neck pain  M54.2 723.1    G89.29 338.29       Patient Active Problem List   Diagnosis     (normal spontaneous vaginal delivery)    Mild intermittent asthma without complication    Thyromegaly    Environmental and seasonal allergies    Elevated liver enzymes    Arthralgia    Memory changes    Asthma    Chronic nasal congestion    Migraine without aura and without status migrainosus, not intractable    Family history of autoimmune disorder    Immunization due    Polyarthralgia    Chronic midline low back pain without sciatica    Chronic neck pain    Fibromyalgia        Past Medical History:   Diagnosis Date    Asthma     Cluster headache     Encounter for preventive health examination 2024    Encounter to establish care 2024    Headache, tension-type     Ingrown toenail of left foot 2024    Memory loss     Migraine     Wears glasses         Past Surgical History:   Procedure Laterality Date    NOSE SURGERY  2016    WISDOM TOOTH EXTRACTION  10/2019                        PT Assessment/Plan       Row Name 24 1100          PT Assessment    Assessment Comments Reports LBP > c-spine pain this date, increased stiffness due to limited ability to perform HEP secondary to illness. Progressed to ellipitcal this date with good tolerance, added doorway stretch, swiss ball to LTR, TB to bridges adn S/L open book. Pt. reports increased tension/cramping in L posterolateral hip girdle, therefore, initiated manual with subjective improvement in symptoms following. Pt. remains appropriate for skilled PT.  -MP        PT Plan    PT Plan Comments A-R, S/L clam?  -MP                User Key  (r) = Recorded By, (t) = Taken By, (c) = Cosigned By      Initials Name Provider Type    Niya Cueva, PT Physical Therapist                       OP Exercises       Row Name 12/02/24 1100             Subjective    Subjective Comments I was sick for a few days so I couldnt do my exercises so I am a little more stiff  -MP         Subjective Pain    Able to rate subjective pain? yes  -MP      Pre-Treatment Pain Level 7  -MP      Post-Treatment Pain Level 7  -MP         Total Minutes    02079 - PT Therapeutic Exercise Minutes 30  -MP      16189 - PT Manual Therapy Minutes 10  -MP         Exercise 1    Exercise Name 1 elliptical  -MP      Cueing 1 Verbal  -MP      Time 1 5 min  -MP         Exercise 4    Exercise Name 4 supine piriformis stretch  -MP      Cueing 4 Verbal;Tactile  -MP      Reps 4 3  -MP      Time 4 20s  -MP         Exercise 5    Exercise Name 5 LTRs  -MP      Cueing 5 Verbal;Demo  -MP      Sets 5 1  -MP      Reps 5 15  -MP      Time 5 5s  -MP      Additional Comments LE over swiss ball  -MP         Exercise 7    Exercise Name 7 beginner bridge + PPT  -MP      Cueing 7 Verbal  -MP      Sets 7 2  -MP      Reps 7 10  -MP      Time 7 3sec at top  -MP      Additional Comments RTB  -MP         Exercise 11    Exercise Name 11 Supine HA  -MP      Cueing 11 Verbal;Demo  -MP      Sets 11 2  -MP      Reps 11 10  -MP      Time 11 RTB  -MP         Exercise 13    Exercise Name 13 S/L open book  -MP      Cueing 13 Verbal;Demo  -MP      Reps 13 15  -MP      Time 13 5s  -MP      Additional Comments arm sliding,when arm was up causing pain in shoulder  -MP                User Key  (r) = Recorded By, (t) = Taken By, (c) = Cosigned By      Initials Name Provider Type    MP Niya Stauffer PT Physical Therapist                             Manual Rx (Last 36 Hours)       Manual Treatments       Row Name 12/02/24 1100             Total Minutes    01026 - PT Manual Therapy Minutes 10  -MP          Manual Rx 1    Manual Rx 1 Location STM/foam roll L posterolateral hip girdle  -MP      Manual Rx 1 Type S/L on R  -MP                User Key  (r) = Recorded By, (t) = Taken By, (c) = Cosigned By      Initials Name Provider Type    Niya Cueva, PT Physical Therapist                     PT OP Goals       Row Name 12/02/24 1200          PT Short Term Goals    STG Date to Achieve 12/06/24  -MP     STG 1 Pt will be independent with initial HEP and postural awareness to improve pain and symptoms.  -MP     STG 1 Progress Ongoing  -MP     STG 2 Pt will improve cervical to 55 degrees bilaterally to improve ability to drive safely.  -MP     STG 2 Progress Ongoing  -MP     STG 3 Pt will show improved postural awareness with appropriate form during functional activities to assist with picking up her kids.  -MP     STG 3 Progress Ongoing  -MP        Long Term Goals    LTG Date to Achieve 01/05/25  -MP     LTG 1 Pt will be independent with advance HEP and postural awareness for continued management of pain and symptoms.  -MP     LTG 1 Progress Ongoing  -MP     LTG 2 Pt will improve NDI perceived disability from 35/50 to <22/50 to improve overall quality of life.  -MP     LTG 2 Progress Ongoing  -MP     LTG 3 Pt will improve LE strength to >4/5 bilaterally without any reports of referred headaches to improve functional ability  -MP     LTG 3 Progress Ongoing  -MP     LTG 4 Pt will decrease neck pain from 7/10 to </= 2/10 to improve participation in ADLs.  -MP     LTG 4 Progress Ongoing  -MP     LTG 5 Patient will improve walking tolerance to >30 min without LBP or cervical pain to improve participation in community activities.  -MP     LTG 5 Progress Ongoing  -MP               User Key  (r) = Recorded By, (t) = Taken By, (c) = Cosigned By      Initials Name Provider Type    Niya Cueva, PT Physical Therapist                    Therapy Education  Given: Symptoms/condition management, Posture/body  mechanics  Program: Reinforced  How Provided: Verbal, Demonstration  Provided to: Patient  Level of Understanding: Verbalized, Demonstrated              Time Calculation:   Start Time: 1130  Stop Time: 1210  Time Calculation (min): 40 min  Total Timed Code Minutes- PT: 40 minute(s)  Timed Charges  50086 - PT Therapeutic Exercise Minutes: 30  85893 - PT Manual Therapy Minutes: 10  Total Minutes  Timed Charges Total Minutes: 40   Total Minutes: 40  Therapy Charges for Today       Code Description Service Date Service Provider Modifiers Qty    38160670823 HC PT MANUAL THERAPY EA 15 MIN 12/2/2024 Niya Stauffer, PT GP 1    22881101340 HC PT THER PROC EA 15 MIN 12/2/2024 Niya Stauffer, PT GP 2                      Niya Stauffer PT  12/2/2024

## 2024-12-05 ENCOUNTER — HOSPITAL ENCOUNTER (OUTPATIENT)
Dept: PHYSICAL THERAPY | Facility: HOSPITAL | Age: 29
Setting detail: THERAPIES SERIES
Discharge: HOME | End: 2024-12-05
Payer: MEDICAID

## 2024-12-05 ENCOUNTER — TELEPHONE (OUTPATIENT)
Dept: NEUROLOGY | Facility: CLINIC | Age: 29
End: 2024-12-05

## 2024-12-05 DIAGNOSIS — G43.709 CHRONIC MIGRAINE WITHOUT AURA WITHOUT STATUS MIGRAINOSUS, NOT INTRACTABLE: Primary | ICD-10-CM

## 2024-12-05 DIAGNOSIS — G89.29 CHRONIC BILATERAL LOW BACK PAIN WITHOUT SCIATICA: ICD-10-CM

## 2024-12-05 DIAGNOSIS — M54.50 CHRONIC BILATERAL LOW BACK PAIN WITHOUT SCIATICA: ICD-10-CM

## 2024-12-05 DIAGNOSIS — M54.2 NECK PAIN: Primary | ICD-10-CM

## 2024-12-05 DIAGNOSIS — M54.2 CHRONIC NECK PAIN: ICD-10-CM

## 2024-12-05 DIAGNOSIS — G89.29 CHRONIC NECK PAIN: ICD-10-CM

## 2024-12-05 PROCEDURE — 97110 THERAPEUTIC EXERCISES: CPT

## 2024-12-05 RX ORDER — DIAZEPAM 5 MG/1
5 TABLET ORAL
Qty: 1 TABLET | Refills: 0 | Status: SHIPPED | OUTPATIENT
Start: 2024-12-05 | End: 2024-12-05

## 2024-12-05 NOTE — THERAPY TREATMENT NOTE
Outpatient Physical Therapy Ortho Treatment Note  [facility]     Patient Name: Kelly Stevens  : 1995  MRN: [id number]  Today's Date: 2024      Visit Date: 2024    Visit Dx:    ICD-10-CM ICD-9-CM   1. Neck pain  M54.2 723.1   2. Chronic bilateral low back pain without sciatica  M54.50 724.2    G89.29 338.29   3. Chronic neck pain  M54.2 723.1    G89.29 338.29       Patient Active Problem List   Diagnosis     (normal spontaneous vaginal delivery)    Mild intermittent asthma without complication    Thyromegaly    Environmental and seasonal allergies    Elevated liver enzymes    Arthralgia    Memory changes    Asthma    Chronic nasal congestion    Migraine without aura and without status migrainosus, not intractable    Family history of autoimmune disorder    Immunization due    Polyarthralgia    Chronic midline low back pain without sciatica    Chronic neck pain    Fibromyalgia        Past Medical History:   Diagnosis Date    Asthma     Cluster headache     Encounter for preventive health examination 2024    Encounter to establish care 2024    Headache, tension-type     Ingrown toenail of left foot 2024    Memory loss     Migraine     Wears glasses         Past Surgical History:   Procedure Laterality Date    NOSE SURGERY  2016    WISDOM TOOTH EXTRACTION  10/2019                        PT Assessment/Plan       Row Name 24 1506          PT Assessment    Assessment Comments Reports resolution of hip pain since manual/STM last session but continues with LBP>c-spine pain. Added S/L clamshells, A-R, cat/cow and PPT w/ heel slides as well as swiss ball to bridges for added concentration to core stability. Pt. Demonstrates muscular fatigue with increasing repetitions/exercises. Pt. Remains appropriate for skilled PT.  -MP        PT Plan    PT Plan Comments consider side stepping, hip hinge?  -MP               User Key  (r) = Recorded By, (t) = Taken By, (c) = Cosigned  By      Initials Name Provider Type    MP Niya Stauffer, PT Physical Therapist                       OP Exercises       Row Name 12/05/24 1400             Subjective    Subjective Comments My lolw back is still more painful, I have not had anymore trouble with my hip  -MP         Subjective Pain    Able to rate subjective pain? yes  -MP      Pre-Treatment Pain Level 6  -MP      Post-Treatment Pain Level 6  -MP         Total Minutes    30271 - PT Therapeutic Exercise Minutes 38  -MP         Exercise 1    Exercise Name 1 NuStep  -MP      Cueing 1 Verbal  -MP      Time 1 5 min  -MP         Exercise 4    Exercise Name 4 supine piriformis stretch  -MP      Cueing 4 Verbal;Tactile  -MP      Reps 4 3  -MP      Time 4 20s  -MP         Exercise 5    Exercise Name 5 LTRs  -MP      Cueing 5 Verbal;Demo  -MP      Sets 5 1  -MP      Reps 5 15  -MP      Time 5 5s  -MP      Additional Comments LE over swiss ball  -MP         Exercise 6    Exercise Name 6 PPT + LE extension  -MP      Cueing 6 Verbal;Demo  -MP      Reps 6 10ea  -MP      Time 6 5s  -MP         Exercise 7    Exercise Name 7 beginner bridge + PPT  -MP      Cueing 7 Verbal  -MP      Sets 7 2  -MP      Reps 7 10  -MP      Time 7 3sec at top  -MP      Additional Comments LE over swiss ball  -MP         Exercise 10    Exercise Name 10 sidelying clamshell  -MP      Cueing 10 Verbal;Demo  -MP      Reps 10 15  -MP      Time 10 RTB  -MP         Exercise 11    Exercise Name 11 Supine HA  -MP      Cueing 11 Verbal;Demo  -MP      Sets 11 2  -MP      Reps 11 10  -MP      Time 11 RTB  -MP         Exercise 13    Exercise Name 13 S/L open book  -MP      Cueing 13 Verbal;Demo  -MP      Reps 13 10  -MP      Time 13 5s  -MP      Additional Comments bottom leg straight, holding top knee down, arm sliding  -MP         Exercise 14    Exercise Name 14 cat/cow  -MP      Cueing 14 Verbal;Demo  -MP      Reps 14 10ea  -MP      Time 14 5s  -MP         Exercise 15    Exercise Name 15 A-R  -MP       Cueing 15 Verbal;Demo  -MP      Sets 15 2  -MP      Reps 15 10ea  -MP      Time 15 RTB  -MP                User Key  (r) = Recorded By, (t) = Taken By, (c) = Cosigned By      Initials Name Provider Type    Niya Cueva, PT Physical Therapist                                  PT OP Goals       Row Name 12/05/24 1400          PT Short Term Goals    STG Date to Achieve 12/06/24  -MP     STG 1 Pt will be independent with initial HEP and postural awareness to improve pain and symptoms.  -MP     STG 1 Progress Ongoing  -MP     STG 2 Pt will improve cervical to 55 degrees bilaterally to improve ability to drive safely.  -MP     STG 2 Progress Ongoing  -MP     STG 3 Pt will show improved postural awareness with appropriate form during functional activities to assist with picking up her kids.  -MP     STG 3 Progress Ongoing  -MP        Long Term Goals    LTG Date to Achieve 01/05/25  -MP     LTG 1 Pt will be independent with advance HEP and postural awareness for continued management of pain and symptoms.  -MP     LTG 1 Progress Ongoing  -MP     LTG 2 Pt will improve NDI perceived disability from 35/50 to <22/50 to improve overall quality of life.  -MP     LTG 2 Progress Ongoing  -MP     LTG 3 Pt will improve LE strength to >4/5 bilaterally without any reports of referred headaches to improve functional ability  -MP     LTG 3 Progress Ongoing  -MP     LTG 4 Pt will decrease neck pain from 7/10 to </= 2/10 to improve participation in ADLs.  -MP     LTG 4 Progress Ongoing  -MP     LTG 5 Patient will improve walking tolerance to >30 min without LBP or cervical pain to improve participation in community activities.  -MP     LTG 5 Progress Ongoing  -MP               User Key  (r) = Recorded By, (t) = Taken By, (c) = Cosigned By      Initials Name Provider Type    Niya Cueva, PT Physical Therapist                    Therapy Education  Education Details: Updated HEP new access code: [email or web address]  Given: HEP,  Symptoms/condition management  Program: Reinforced, New, Progressed  How Provided: Verbal, Demonstration, Written  Provided to: Patient  Level of Understanding: Verbalized, Demonstrated              Time Calculation:   Start Time: 1415  Stop Time: 1453  Time Calculation (min): 38 min  Total Timed Code Minutes- PT: 38 minute(s)  Timed Charges  37725 - PT Therapeutic Exercise Minutes: 38  Total Minutes  Timed Charges Total Minutes: 38   Total Minutes: 38  Therapy Charges for Today       Code Description Service Date Service Provider Modifiers Qty    23614628152 HC PT THER PROC EA 15 MIN 12/5/2024 Niya Stauffer, PT GP 3                      Niya Stauffer, PT  12/5/2024

## 2024-12-05 NOTE — TELEPHONE ENCOUNTER
Left voicemail for patient.  I did send a Valium over however she cannot breast-feed if she takes the medication.  I did call the patient and left a detailed message on her phone.  Can we try calling her again either later today or tomorrow just to make sure she got the message?

## 2024-12-06 ENCOUNTER — HOSPITAL ENCOUNTER (OUTPATIENT)
Dept: GENERAL RADIOLOGY | Facility: HOSPITAL | Age: 29
Discharge: HOME | End: 2024-12-06
Payer: MEDICAID

## 2024-12-06 ENCOUNTER — HOSPITAL ENCOUNTER (OUTPATIENT)
Dept: MRI IMAGING | Facility: HOSPITAL | Age: 29
End: 2024-12-06
Payer: MEDICAID

## 2024-12-06 DIAGNOSIS — G89.29 CHRONIC MIDLINE LOW BACK PAIN WITHOUT SCIATICA: Chronic | ICD-10-CM

## 2024-12-06 DIAGNOSIS — M54.50 CHRONIC MIDLINE LOW BACK PAIN WITHOUT SCIATICA: Chronic | ICD-10-CM

## 2024-12-06 DIAGNOSIS — G89.29 CHRONIC NECK PAIN: ICD-10-CM

## 2024-12-06 DIAGNOSIS — M54.2 CHRONIC NECK PAIN: ICD-10-CM

## 2024-12-06 PROCEDURE — 72100 X-RAY EXAM L-S SPINE 2/3 VWS: CPT

## 2024-12-06 PROCEDURE — 72040 X-RAY EXAM NECK SPINE 2-3 VW: CPT

## 2024-12-09 ENCOUNTER — HOSPITAL ENCOUNTER (OUTPATIENT)
Dept: PHYSICAL THERAPY | Facility: HOSPITAL | Age: 29
Setting detail: THERAPIES SERIES
Discharge: HOME | End: 2024-12-09
Payer: MEDICAID

## 2024-12-09 DIAGNOSIS — M54.2 CHRONIC NECK PAIN: ICD-10-CM

## 2024-12-09 DIAGNOSIS — G89.29 CHRONIC BILATERAL LOW BACK PAIN WITHOUT SCIATICA: ICD-10-CM

## 2024-12-09 DIAGNOSIS — M54.2 NECK PAIN: Primary | ICD-10-CM

## 2024-12-09 DIAGNOSIS — G89.29 CHRONIC NECK PAIN: ICD-10-CM

## 2024-12-09 DIAGNOSIS — M54.50 CHRONIC BILATERAL LOW BACK PAIN WITHOUT SCIATICA: ICD-10-CM

## 2024-12-09 PROCEDURE — 97110 THERAPEUTIC EXERCISES: CPT

## 2024-12-09 PROCEDURE — 97140 MANUAL THERAPY 1/> REGIONS: CPT

## 2024-12-09 NOTE — THERAPY PROGRESS REPORT/RE-CERT
Outpatient Physical Therapy Ortho Progress Note  [facility]     Patient Name: Kelly Stevens  : 1995  MRN: [id number]  Today's Date: 2024      Visit Date: 2024    Visit Dx:    ICD-10-CM ICD-9-CM   1. Neck pain  M54.2 723.1   2. Chronic bilateral low back pain without sciatica  M54.50 724.2    G89.29 338.29   3. Chronic neck pain  M54.2 723.1    G89.29 338.29       Patient Active Problem List   Diagnosis     (normal spontaneous vaginal delivery)    Mild intermittent asthma without complication    Thyromegaly    Environmental and seasonal allergies    Elevated liver enzymes    Arthralgia    Memory changes    Asthma    Chronic nasal congestion    Migraine without aura and without status migrainosus, not intractable    Family history of autoimmune disorder    Immunization due    Polyarthralgia    Chronic midline low back pain without sciatica    Chronic neck pain    Fibromyalgia        Past Medical History:   Diagnosis Date    Asthma     Cluster headache     Encounter for preventive health examination 2024    Encounter to establish care 2024    Headache, tension-type     Ingrown toenail of left foot 2024    Memory loss     Migraine     Wears glasses         Past Surgical History:   Procedure Laterality Date    NOSE SURGERY  2016    WISDOM TOOTH EXTRACTION  10/2019        PT Ortho       Row Name 24 1100       General ROM    Head/Neck/Trunk Neck Lt Rotation;Neck Rt Rotation  -MP       Head/Neck/Trunk    Neck Lt Rotation AROM 0-45  -MP    Neck Rt Rotation AROM 0-48  -MP              User Key  (r) = Recorded By, (t) = Taken By, (c) = Cosigned By      Initials Name Provider Type    Niya Cueva, PT Physical Therapist                                 PT Assessment/Plan       Row Name 24 1100          PT Assessment    Functional Limitations Limitation in home management;Limitations in community activities;Limitations in functional capacity and  performance;Performance in self-care ADL  -MP     Impairments Endurance;Joint mobility;Muscle strength;Pain;Poor body mechanics;Posture;Range of motion  -MP     Assessment Comments Kelly Stevens has been seen for 5 physical therapy sessions for chronic neck and LBP. Treatment has included therapeutic exercise, manual therapy, and patient education with home exercise program . Progress to physical therapy goals is fair as pt. Has met 1/3 STGs, and 0/5 LTGs. She reports reducing pain levels but continued soreness with initiating/progressing program, she verbalizes 15% improvement in overall condition. She continues with limitations in cervical rotation AROM and moderate pain levels. Pt. Has 9 month old son and is frequently lifting/carrying car seat likely contributing to symptoms, discussed body mechanics and alternative positions to carry car seat to reduce undue strain to neck/back. She will benefit from continued skilled physical therapy to address remaining impairments and functional limitations.  -MP     Please refer to paper survey for additional self-reported information Yes  -MP     Rehab Potential Good  -MP     Patient/caregiver participated in establishment of treatment plan and goals Yes  -MP     Patient would benefit from skilled therapy intervention Yes  -MP        PT Plan    PT Frequency 2x/week;1x/week  -MP     Predicted Duration of Therapy Intervention (PT) 6 visits  -MP     Planned CPT's? PT RE-EVAL: 56360;PT THER PROC EA 15 MIN: 34638;PT THER ACT EA 15 MIN: 47027;PT NEUROMUSC RE-EDUCATION EA 15 MIN: 75252;PT MANUAL THERAPY EA 15 MIN: 34545;PT SELF CARE/HOME MGMT/TRAIN EA 15: 77039;PT HOT OR COLD PACK TREAT [facility];PT ELECTRICAL STIM UNATTEND: ;PT ULTRASOUND EA 15 MIN: 96743;PT TRACTION CERVICAL: 17900;PT TRACTION LUMBAR: 27374  -MP     PT Plan Comments rows, shoulder ext?  -MP               User Key  (r) = Recorded By, (t) = Taken By, (c) = Cosigned By      Initials Name Provider Type    MP  Niya Stauffer, PT Physical Therapist                       OP Exercises       Row Name 12/09/24 1100             Subjective    Subjective Comments My neck is stiff  -MP         Subjective Pain    Able to rate subjective pain? yes  -MP      Pre-Treatment Pain Level 7  -MP      Post-Treatment Pain Level 7  -MP         Total Minutes    34883 - PT Therapeutic Exercise Minutes 30  -MP      03845 - PT Manual Therapy Minutes 10  -MP         Exercise 1    Exercise Name 1 NuStep  -MP      Cueing 1 Verbal  -MP      Time 1 5 min  -MP         Exercise 2    Exercise Name 2 Chin tucks  -MP      Cueing 2 Verbal;Demo  -MP      Reps 2 15  -MP      Time 2 5sec  -MP         Exercise 3    Exercise Name 3 Chin tucks w/ rotation  -MP      Cueing 3 Verbal;Demo  -MP      Sets 3 2  -MP      Reps 3 10  -MP      Time 3 5s  -MP         Exercise 10    Exercise Name 10 sidelying clamshell  -MP      Cueing 10 Verbal;Demo  -MP      Sets 10 2  -MP      Reps 10 10  -MP      Time 10 RTB  -MP         Exercise 11    Exercise Name 11 Supine HA  -MP      Cueing 11 Verbal;Demo  -MP      Sets 11 2  -MP      Reps 11 10  -MP      Time 11 RTB  -MP         Exercise 13    Exercise Name 13 S/L open book  -MP      Cueing 13 Verbal;Demo  -MP      Reps 13 10  -MP      Time 13 5s  -MP         Exercise 14    Exercise Name 14 cat/cow  -MP      Cueing 14 Verbal;Demo  -MP      Reps 14 10ea  -MP      Time 14 5s  -MP                User Key  (r) = Recorded By, (t) = Taken By, (c) = Cosigned By      Initials Name Provider Type    MP Niya Stauffer, PT Physical Therapist                             Manual Rx (Last 36 Hours)       Manual Treatments       Row Name 12/09/24 1100             Total Minutes    68467 - PT Manual Therapy Minutes 10  -MP         Manual Rx 1    Manual Rx 1 Location STM B UT/LS, suboccipital release, gentle c-spine traction  -MP      Manual Rx 1 Type supine  -MP                User Key  (r) = Recorded By, (t) = Taken By, (c) = Cosigned By       Initials Name Provider Type    Niya Cueva, PT Physical Therapist                     PT OP Goals       Row Name 12/09/24 1100          PT Short Term Goals    STG Date to Achieve 12/06/24  -MP     STG 1 Pt will be independent with initial HEP and postural awareness to improve pain and symptoms.  -MP     STG 1 Progress Met  -MP     STG 1 Progress Comments reports compliance  -MP     STG 2 Pt will improve cervical rotation to 55 degrees bilaterally to improve ability to drive safely.  -MP     STG 2 Progress Goal Revised  -MP     STG 2 Progress Comments progressing  -MP     STG 3 Pt will show improved postural awareness with appropriate form during functional activities to assist with picking up her kids.  -MP     STG 3 Progress Ongoing  -MP     STG 3 Progress Comments discussed alternative ways to carry car seat but not observed in clinic  -MP        Long Term Goals    LTG Date to Achieve 01/05/25  -MP     LTG 1 Pt will be independent with advance HEP and postural awareness for continued management of pain and symptoms.  -MP     LTG 1 Progress Ongoing  -MP     LTG 2 Pt will improve NDI perceived disability from 35/50 to <22/50 to improve overall quality of life.  -MP     LTG 2 Progress Ongoing  -MP     LTG 2 Progress Comments 31  -MP     LTG 3 Pt will improve LE strength to >4/5 bilaterally without any reports of referred headaches to improve functional ability  -MP     LTG 3 Progress Ongoing  -MP     LTG 4 Pt will decrease neck pain from 7/10 to </= 2/10 to improve participation in ADLs.  -MP     LTG 4 Progress Ongoing  -MP     LTG 5 Patient will improve walking tolerance to >30 min without LBP or cervical pain to improve participation in community activities.  -MP     LTG 5 Progress Ongoing  -MP               User Key  (r) = Recorded By, (t) = Taken By, (c) = Cosigned By      Initials Name Provider Type    Niya Cueva, PT Physical Therapist                    Therapy Education  Given:  Symptoms/condition management, Posture/body mechanics  Program: Reinforced  How Provided: Verbal, Demonstration  Provided to: Patient  Level of Understanding: Verbalized, Demonstrated    Outcome Measure Options: Neck Disability Index (NDI)  Neck Disability Index  Section 1 - Pain Intensity: The pain is moderate and does not vary much.  Section 2 - Personal Care: It is painful to look after myself, and I am slow and careful.  Section 3 - Lifting: Pain prevents me from lifting heavy weights, but I can manage light to medium weights if they are conveniently positioned.  Section 4 - Reading: I cannot read as much as I want because of moderate neck pain.  Section 5 - Headaches: I have severe headaches that come frequently.  Section 6 - Concentration: I have a great deal of difficulty in concentrating when I want to.  Section 7 - Work: I cannot do my usual work.  Section 8 - Driving: I can drive as long as I want with moderate neck pain.  Section 9 - Sleeping: My sleep is greatly disturbed (3-5 hours sleepless).  Section 10 - Recreation: I am able to engage in a few of my usual recreational activities because of pain in my neck.  Neck Disability Index Score: 31      Time Calculation:   Start Time: 1128  Stop Time: 1208  Time Calculation (min): 40 min  Total Timed Code Minutes- PT: 40 minute(s)  Timed Charges  40159 - PT Therapeutic Exercise Minutes: 30  17907 - PT Manual Therapy Minutes: 10  Total Minutes  Timed Charges Total Minutes: 40   Total Minutes: 40  Therapy Charges for Today       Code Description Service Date Service Provider Modifiers Qty    17491277524 HC PT MANUAL THERAPY EA 15 MIN 12/9/2024 Niya Stauffer, PT GP 1    52433237191 HC PT THER PROC EA 15 MIN 12/9/2024 Niya Stauffer, PT GP 2            PT G-Codes  Outcome Measure Options: Neck Disability Index (NDI)  Neck Disability Index Score: 31         Niya Stauffer, PT  12/9/2024

## 2024-12-10 ENCOUNTER — TELEPHONE (OUTPATIENT)
Dept: FAMILY MEDICINE CLINIC | Facility: CLINIC | Age: 29
End: 2024-12-10
Payer: MEDICAID

## 2024-12-10 NOTE — TELEPHONE ENCOUNTER
"Relay     \"No arthritis or deformities seen in the lumbar spine or low back region of the spine.  Only mild scoliosis in the lower back region.  Please complete physical therapy for low back pain.     However on the cervical or neck x-ray there is arthritis seen that is usually associated with repetitive motion at the neck level.  Recommend avoiding excessive motion at the neck or repetitive motion to prevent worsening of arthritis of the neck.  Please follow-up with physical therapy for neck pain.  Return to clinic for further evaluation and discussion, if either neck or low back pain persist despite completion of physical therapy. \"                "

## 2024-12-10 NOTE — TELEPHONE ENCOUNTER
----- Message from Manuela Cook sent at 12/9/2024  3:16 PM EST -----  No arthritis or deformities seen in the lumbar spine or low back region of the spine.  Only mild scoliosis in the lower back region.  Please complete physical therapy for low back pain.    However on the cervical or neck x-ray there is arthritis seen that is usually associated with repetitive motion at the neck level.  Recommend avoiding excessive motion at the neck or repetitive motion to prevent worsening of arthritis of the neck.  Please follow-up with physical therapy for neck pain.  Return to clinic for further evaluation and discussion, if either neck or low back pain persist despite completion of physical therapy.

## 2024-12-11 ENCOUNTER — APPOINTMENT (OUTPATIENT)
Dept: PHYSICAL THERAPY | Facility: HOSPITAL | Age: 29
End: 2024-12-11
Payer: MEDICAID

## 2024-12-16 ENCOUNTER — TELEPHONE (OUTPATIENT)
Dept: PHYSICAL THERAPY | Facility: OTHER | Age: 29
End: 2024-12-16

## 2024-12-16 ENCOUNTER — TELEPHONE (OUTPATIENT)
Dept: PHYSICAL THERAPY | Facility: HOSPITAL | Age: 29
End: 2024-12-16

## 2024-12-16 ENCOUNTER — APPOINTMENT (OUTPATIENT)
Dept: PHYSICAL THERAPY | Facility: HOSPITAL | Age: 29
End: 2024-12-16
Payer: MEDICAID

## 2024-12-16 ENCOUNTER — OFFICE VISIT (OUTPATIENT)
Dept: FAMILY MEDICINE CLINIC | Facility: CLINIC | Age: 29
End: 2024-12-16
Payer: MEDICAID

## 2024-12-16 VITALS
TEMPERATURE: 99.6 F | OXYGEN SATURATION: 98 % | HEART RATE: 91 BPM | DIASTOLIC BLOOD PRESSURE: 82 MMHG | WEIGHT: 191.8 LBS | SYSTOLIC BLOOD PRESSURE: 124 MMHG | HEIGHT: 65 IN | BODY MASS INDEX: 31.96 KG/M2 | RESPIRATION RATE: 16 BRPM

## 2024-12-16 DIAGNOSIS — G89.29 CHRONIC MIDLINE LOW BACK PAIN WITHOUT SCIATICA: Chronic | ICD-10-CM

## 2024-12-16 DIAGNOSIS — M47.816 FACET ARTHRITIS OF LUMBAR REGION: ICD-10-CM

## 2024-12-16 DIAGNOSIS — J45.20 MILD INTERMITTENT ASTHMA WITHOUT COMPLICATION: Chronic | ICD-10-CM

## 2024-12-16 DIAGNOSIS — M54.50 CHRONIC MIDLINE LOW BACK PAIN WITHOUT SCIATICA: Chronic | ICD-10-CM

## 2024-12-16 DIAGNOSIS — M41.86 DEXTROSCOLIOSIS OF LUMBAR SPINE: ICD-10-CM

## 2024-12-16 DIAGNOSIS — G89.29 CHRONIC NECK PAIN: Primary | Chronic | ICD-10-CM

## 2024-12-16 DIAGNOSIS — J30.89 ENVIRONMENTAL AND SEASONAL ALLERGIES: Chronic | ICD-10-CM

## 2024-12-16 DIAGNOSIS — M54.2 CHRONIC NECK PAIN: Primary | Chronic | ICD-10-CM

## 2024-12-16 DIAGNOSIS — M47.812 FACET ARTHRITIS OF CERVICAL REGION: ICD-10-CM

## 2024-12-16 DIAGNOSIS — Z23 IMMUNIZATION DUE: ICD-10-CM

## 2024-12-16 DIAGNOSIS — G43.009 MIGRAINE WITHOUT AURA AND WITHOUT STATUS MIGRAINOSUS, NOT INTRACTABLE: ICD-10-CM

## 2024-12-16 DIAGNOSIS — R41.3 MEMORY CHANGES: ICD-10-CM

## 2024-12-16 PROCEDURE — 1160F RVW MEDS BY RX/DR IN RCRD: CPT | Performed by: STUDENT IN AN ORGANIZED HEALTH CARE EDUCATION/TRAINING PROGRAM

## 2024-12-16 PROCEDURE — 99214 OFFICE O/P EST MOD 30 MIN: CPT | Performed by: STUDENT IN AN ORGANIZED HEALTH CARE EDUCATION/TRAINING PROGRAM

## 2024-12-16 PROCEDURE — 1125F AMNT PAIN NOTED PAIN PRSNT: CPT | Performed by: STUDENT IN AN ORGANIZED HEALTH CARE EDUCATION/TRAINING PROGRAM

## 2024-12-16 PROCEDURE — 90471 IMMUNIZATION ADMIN: CPT | Performed by: STUDENT IN AN ORGANIZED HEALTH CARE EDUCATION/TRAINING PROGRAM

## 2024-12-16 PROCEDURE — 90677 PCV20 VACCINE IM: CPT | Performed by: STUDENT IN AN ORGANIZED HEALTH CARE EDUCATION/TRAINING PROGRAM

## 2024-12-16 PROCEDURE — 1159F MED LIST DOCD IN RCRD: CPT | Performed by: STUDENT IN AN ORGANIZED HEALTH CARE EDUCATION/TRAINING PROGRAM

## 2024-12-16 RX ORDER — CETIRIZINE HYDROCHLORIDE 10 MG/1
10 TABLET ORAL DAILY
Qty: 90 TABLET | Refills: 3 | Status: SHIPPED | OUTPATIENT
Start: 2024-12-16

## 2024-12-16 RX ORDER — AMITRIPTYLINE HYDROCHLORIDE 10 MG/1
10 TABLET ORAL NIGHTLY
Qty: 30 TABLET | Refills: 5 | Status: SHIPPED | OUTPATIENT
Start: 2024-12-16

## 2024-12-16 RX ORDER — BACLOFEN 10 MG/1
10 TABLET ORAL NIGHTLY PRN
Qty: 30 TABLET | Refills: 1 | Status: SHIPPED | OUTPATIENT
Start: 2024-12-16 | End: 2025-03-16

## 2024-12-16 RX ORDER — FLUTICASONE PROPIONATE 50 MCG
2 SPRAY, SUSPENSION (ML) NASAL DAILY
Qty: 16 G | Refills: 11 | Status: SHIPPED | OUTPATIENT
Start: 2024-12-16

## 2024-12-16 RX ORDER — BUDESONIDE AND FORMOTEROL FUMARATE DIHYDRATE 80; 4.5 UG/1; UG/1
2 AEROSOL RESPIRATORY (INHALATION)
Qty: 10.2 G | Refills: 5 | Status: SHIPPED | OUTPATIENT
Start: 2024-12-16

## 2024-12-16 RX ORDER — ACETAMINOPHEN 500 MG
500 TABLET ORAL EVERY 8 HOURS PRN
Qty: 100 TABLET | Refills: 2 | Status: SHIPPED | OUTPATIENT
Start: 2024-12-16

## 2024-12-16 RX ORDER — ELECTROLYTES/DEXTROSE
1 SOLUTION, ORAL ORAL DAILY
Qty: 90 TABLET | Refills: 3 | Status: SHIPPED | OUTPATIENT
Start: 2024-12-16 | End: 2025-12-11

## 2024-12-16 RX ORDER — ALBUTEROL SULFATE 90 UG/1
2 INHALANT RESPIRATORY (INHALATION) EVERY 6 HOURS PRN
Qty: 8.7 G | Refills: 5 | Status: SHIPPED | OUTPATIENT
Start: 2024-12-16

## 2024-12-16 NOTE — PROGRESS NOTES
"Chief Complaint  althralgia (Seeing Rheumatology; but stated can be treated via PCP), Neck Pain (Still present; discuss results in further detail), and Back Pain (Discuss results in further detail; currently in PT)    Subjective        Kelly Stevens presents to [facility]  History of Present Illness  28-year-old white female with a history of chronic neck and low back pain here for chronic disease management follow-up visit and also discussion of recent x-rays.    Pt s/p rheumatology and dx fibromyalgia.    Instructed patient to get the adult preventive immunization that are due at  the pharmacy, including - flu, Tdap, prevnar .    Discussed facet arthritis of C, L spine. Also dextroscoliosis of L spine. Pt at session 5 of PT.  Pt c/o significant LBP and had to stop working since 2/2024.  Pt reports abdominal pain has resolved.    Patient complaining of migraine headaches reports sometimes she gets 3 migraine headaches in a week.  Discussed amitriptyline for migraine prophylaxis, patient voiced understanding.  Neck Pain     Back Pain        Objective   Vital Signs:  /82 (BP Location: Right arm, Patient Position: Sitting, Cuff Size: Adult)   Pulse 91   Temp 99.6 °F (37.6 °C) (Temporal)   Resp 16   Ht 165.1 cm (65\")   Wt 87 kg (191 lb 12.8 oz)   SpO2 98%   BMI 31.92 kg/m²   Estimated body mass index is 31.92 kg/m² as calculated from the following:    Height as of this encounter: 165.1 cm (65\").    Weight as of this encounter: 87 kg (191 lb 12.8 oz).               Physical Exam  Constitutional:       Appearance: Normal appearance.   HENT:      Head: Normocephalic and atraumatic.   Eyes:      Conjunctiva/sclera: Conjunctivae normal.   Cardiovascular:      Rate and Rhythm: Normal rate and regular rhythm.      Heart sounds: Normal heart sounds.   Pulmonary:      Effort: Pulmonary effort is normal.      Breath sounds: Normal breath sounds.   Abdominal:      General: Bowel " sounds are normal.      Palpations: Abdomen is soft.      Comments: Non-tender   Musculoskeletal:      Comments: Positive L, C spine pain on palpation.  C spine pain on ROM of neck.  +SLR b/l with sx in ipsilateral calf.   Skin:     General: Skin is warm.   Neurological:      General: No focal deficit present.      Mental Status: She is alert and oriented to person, place, and time.   Psychiatric:         Mood and Affect: Mood normal.         Behavior: Behavior normal.        Result Review :    The following data was reviewed by: MICHELLE Grant on 12/16/2024:  CMP          3/6/2024    19:10 4/16/2024    09:22 10/16/2024    14:26   CMP   Glucose 81  83  82    BUN 7  15  10    Creatinine 0.58  0.79  0.73    EGFR 125.8      Sodium 135  138  141    Potassium 3.8  4.1  4.6    Chloride 101  101  102    Calcium 8.4  9.7  9.3    Total Protein  6.8  6.7    Total Protein 6.6      Albumin 3.6  4.5  4.4    Globulin  2.3  2.3    Globulin 3.0      Total Bilirubin 0.3  <0.2  0.3    Alkaline Phosphatase 167  135  103    AST (SGOT) 16  63  25    ALT (SGPT) 12  94  23    Albumin/Globulin Ratio 1.2      BUN/Creatinine Ratio 12.1  19.0  14    Anion Gap 15.4        CBC          3/6/2024    19:10 3/8/2024    06:28 8/9/2024    14:27   CBC   WBC 12.05  12.27  9.15       RBC 4.73  4.15  4.78       Hemoglobin 12.9  11.4  13.1       Hematocrit 37.4  33.9  40.0       MCV 79.1  81.7  83.7       MCH 27.3  27.5  27.4       MCHC 34.5  33.6  32.8       RDW 12.5  12.4  13.0       Platelets 284  302  330          Details          This result is from an external source.             Lipid Panel          4/16/2024    09:22 4/29/2024    09:03   Lipid Panel   Total Cholesterol 180     Triglycerides 45  55    HDL Cholesterol 94     VLDL Cholesterol 9     LDL Cholesterol  77       TSH          4/16/2024    09:22   TSH   TSH 0.802            Data reviewed : Radiologic studies reviewed recent x-ray results from L and C-spine with patient done  "in December 2024.  Results pasted below.  and Consultant notes rheumatology from October 28, 2024 patient was diagnosed with fibromyalgia.    Cervical and lumbar spine x-ray results done on December 6, 2024, pasted below-    \"Narrative & Impression   XR SPINE CERVICAL 2 OR 3 VW-, XR SPINE LUMBAR 2 OR 3 VW-     HISTORY: chronic neck pain; M54.2-Cervicalgia; G89.29-Other chronic pain     COMPARISON: None     FINDINGS:  CERVICAL SPINE:. Vertebral body and disc space heights appear preserved.  There is no evidence for fracture or malalignment. There appears to be  moderate facet arthritis. Soft tissues appear normal. Prevertebral soft  tissues are normal.        LUMBAR SPINE:   1. Mild dextroscoliotic curvature of the lumbar spine. Vertebral body  and the space heights appear within normal limits.  There is no evidence  for fracture malalignment or acute abnormality. Soft tissues appear  normal.     IMPRESSION:  1. No evidence for fracture malalignment the cervical spine. Moderate  facet arthritis.  2. Mild dextroscoliotic curvature of the lumbar spine without evidence  for fracture or acute abnormality of the lumbar spine.\"            Assessment and Plan     Diagnoses and all orders for this visit:    1. Chronic neck pain (Primary)  Assessment & Plan:  Will refer to pain management.    Orders:  -     baclofen (LIORESAL) 10 MG tablet; Take 1 tablet by mouth At Night As Needed for Muscle Spasms for up to 90 days.  Dispense: 30 tablet; Refill: 1  -     Diclofenac Sodium (VOLTAREN) 1 % gel gel; Apply 4 g topically to the appropriate area as directed 2 (Two) Times a Day As Needed (MSK pain) for up to 90 days.  Dispense: 100 g; Refill: 2  -     Ambulatory Referral to Pain Management  -     Ambulatory Referral to Neurosurgery  -     MRI Lumbar Spine Without Contrast; Future  -     acetaminophen (TYLENOL) 500 MG tablet; Take 1 tablet by mouth Every 8 (Eight) Hours As Needed for Mild Pain.  Dispense: 100 tablet; Refill: 2    2. " Chronic midline low back pain without sciatica  Assessment & Plan:  Discussed mild dextroscoliosis and facet arthritis with patient today.  Patient states her back pain is significant and she is unable to work.  Discussed pain management referral, patient voiced understanding.  Also discussed MRI of lumbar spine patient status post 5 sessions of physical therapy.  Also discussed neurosurgery referral for further evaluation and recommendations, patient voiced understanding.    At length with patient on avoiding excessive bending, lifting, prolonged standing, walking or any type of repetitive activity that it would further exacerbate the neck and low back pain.    Orders:  -     baclofen (LIORESAL) 10 MG tablet; Take 1 tablet by mouth At Night As Needed for Muscle Spasms for up to 90 days.  Dispense: 30 tablet; Refill: 1  -     Diclofenac Sodium (VOLTAREN) 1 % gel gel; Apply 4 g topically to the appropriate area as directed 2 (Two) Times a Day As Needed (MSK pain) for up to 90 days.  Dispense: 100 g; Refill: 2  -     Ambulatory Referral to Pain Management  -     Ambulatory Referral to Neurosurgery  -     MRI Lumbar Spine Without Contrast; Future  -     acetaminophen (TYLENOL) 500 MG tablet; Take 1 tablet by mouth Every 8 (Eight) Hours As Needed for Mild Pain.  Dispense: 100 tablet; Refill: 2    3. Immunization due  Assessment & Plan:  Patient consented to receive Prevnar 20 here at the clinic today.    Orders:  -     Pneumococcal Conjugate Vaccine 20-Valent (PCV20)    4. Mild intermittent asthma without complication  Assessment & Plan:  Stable and under control on the current regimen.          Orders:  -     budesonide-formoterol (SYMBICORT) 80-4.5 MCG/ACT inhaler; Inhale 2 puffs 2 (Two) Times a Day. Please rinse mouth with water after each use.  Dispense: 10.2 g; Refill: 5  -     albuterol sulfate  (90 Base) MCG/ACT inhaler; Inhale 2 puffs Every 6 (Six) Hours As Needed for Wheezing or Shortness of Air.   Dispense: 8.7 g; Refill: 5    5. Environmental and seasonal allergies  Assessment & Plan:  Stable and under control.    Orders:  -     fluticasone (FLONASE) 50 MCG/ACT nasal spray; Administer 2 sprays into the nostril(s) as directed by provider Daily.  Dispense: 16 g; Refill: 11  -     cetirizine (zyrTEC) 10 MG tablet; Take 1 tablet by mouth Daily.  Dispense: 90 tablet; Refill: 3    6. Memory changes  Assessment & Plan:  Stable.    Orders:  -     multivitamin with minerals (Multivitamin Adult) tablet tablet; Take 1 tablet by mouth Daily for 360 days.  Dispense: 90 tablet; Refill: 3    7. Migraine without aura and without status migrainosus, not intractable  Assessment & Plan:  Discussed amitriptyline for migraine prophylaxis, patient voiced understanding.            Orders:  -     amitriptyline (ELAVIL) 10 MG tablet; Take 1 tablet by mouth Every Night.  Dispense: 30 tablet; Refill: 5    8. Facet arthritis of cervical region  -     Ambulatory Referral to Pain Management  -     Ambulatory Referral to Neurosurgery    9. Facet arthritis of lumbar region  -     Ambulatory Referral to Pain Management  -     Ambulatory Referral to Neurosurgery    10. Dextroscoliosis of lumbar spine  -     Ambulatory Referral to Pain Management  -     Ambulatory Referral to Neurosurgery        All chronic conditions have been addressed and treated by the practice or other specialists. Medications have been reconciled and refilled as appropriate. Reiterated compliance and timely follow up appointments. Side effects of all new and old medications reviewed with the patient and patient willing to accept all risks involved. Advised RTO if no improvement or worsening of symptoms or if any new complaints arise. Patient advised to follow up with clinic or call after diagnostic tests, if patient does not hear from office 3 days after the test completion.          Follow Up     Return in about 3 months (around 3/16/2025) for Next scheduled follow  up.  Patient was given instructions and counseling regarding her condition or for health maintenance advice. Please see specific information pulled into the AVS if appropriate.

## 2024-12-16 NOTE — ASSESSMENT & PLAN NOTE
Discussed mild dextroscoliosis and facet arthritis with patient today.  Patient states her back pain is significant and she is unable to work.  Discussed pain management referral, patient voiced understanding.  Also discussed MRI of lumbar spine patient status post 5 sessions of physical therapy.  Also discussed neurosurgery referral for further evaluation and recommendations, patient voiced understanding.    At length with patient on avoiding excessive bending, lifting, prolonged standing, walking or any type of repetitive activity that it would further exacerbate the neck and low back pain.

## 2024-12-17 NOTE — TELEPHONE ENCOUNTER
Finally was able to reach the patient, she rescheduled her follow up appointment for 01/10/2025. I asked her if she scheduled her MRI at an outside facility since I do not see it on the schedule. Patient stated that she didn't reschedule it because a Valium is not strong enough for her. I informed her that I would have to speak with Donna and I would call her back.

## 2024-12-23 ENCOUNTER — HOSPITAL ENCOUNTER (OUTPATIENT)
Dept: PHYSICAL THERAPY | Facility: HOSPITAL | Age: 29
Setting detail: THERAPIES SERIES
Discharge: HOME | End: 2024-12-23
Payer: MEDICAID

## 2024-12-23 DIAGNOSIS — M54.50 CHRONIC BILATERAL LOW BACK PAIN WITHOUT SCIATICA: ICD-10-CM

## 2024-12-23 DIAGNOSIS — G89.29 CHRONIC BILATERAL LOW BACK PAIN WITHOUT SCIATICA: ICD-10-CM

## 2024-12-23 DIAGNOSIS — G89.29 CHRONIC NECK PAIN: ICD-10-CM

## 2024-12-23 DIAGNOSIS — M54.2 CHRONIC NECK PAIN: ICD-10-CM

## 2024-12-23 DIAGNOSIS — M54.2 NECK PAIN: Primary | ICD-10-CM

## 2024-12-23 PROCEDURE — 97140 MANUAL THERAPY 1/> REGIONS: CPT

## 2024-12-23 PROCEDURE — 97110 THERAPEUTIC EXERCISES: CPT

## 2024-12-23 NOTE — THERAPY TREATMENT NOTE
Outpatient Physical Therapy Ortho Treatment Note  [facility]     Patient Name: Kelly Stevens  : 1995  MRN: [id number]  Today's Date: 2024      Visit Date: 2024    Visit Dx:    ICD-10-CM ICD-9-CM   1. Neck pain  M54.2 723.1   2. Chronic neck pain  M54.2 723.1    G89.29 338.29   3. Chronic bilateral low back pain without sciatica  M54.50 724.2    G89.29 338.29       Patient Active Problem List   Diagnosis     (normal spontaneous vaginal delivery)    Mild intermittent asthma without complication    Thyromegaly    Environmental and seasonal allergies    Elevated liver enzymes    Arthralgia    Memory changes    Asthma    Chronic nasal congestion    Migraine without aura and without status migrainosus, not intractable    Family history of autoimmune disorder    Immunization due    Polyarthralgia    Chronic midline low back pain without sciatica    Chronic neck pain    Fibromyalgia    Facet arthritis of cervical region    Facet arthritis of lumbar region    Dextroscoliosis of lumbar spine        Past Medical History:   Diagnosis Date    Asthma     Cluster headache     Encounter for preventive health examination 2024    Encounter to establish care 2024    Headache, tension-type     Ingrown toenail of left foot 2024    Memory loss     Migraine     Wears glasses         Past Surgical History:   Procedure Laterality Date    NOSE SURGERY  2016    WISDOM TOOTH EXTRACTION  10/2019                        PT Assessment/Plan       Row Name 24 1100          PT Assessment    Assessment Comments Slight improvement in symptoms particularly ability to sleep since beginning muscle relaxer over last week. Primary complaint of neck > lumbar pain this date, therefore continued with manual interventions and ther ex focused primarily on c-spine. Added supine SAP, lift off to c-spine chin tuck for deep cervical strengthening and increased resistance on HA. Pt. remains appropriate for  skilled PT.  -MP        PT Plan    PT Plan Comments rows, shoulder ext  -MP               User Key  (r) = Recorded By, (t) = Taken By, (c) = Cosigned By      Initials Name Provider Type    MP Niya Stauffer, PT Physical Therapist                       OP Exercises       Row Name 12/23/24 1100             Subjective    Subjective Comments My neck is still worse  -MP         Subjective Pain    Able to rate subjective pain? yes  -MP      Pre-Treatment Pain Level 6  -MP      Post-Treatment Pain Level 6  -MP         Total Minutes    38896 - PT Therapeutic Exercise Minutes 28  -MP      63828 - PT Manual Therapy Minutes 10  -MP         Exercise 1    Exercise Name 1 NuStep  -MP      Cueing 1 Verbal  -MP      Time 1 5 min  -MP         Exercise 2    Exercise Name 2 Chin tucks + lift off  -MP      Cueing 2 Verbal;Demo  -MP      Reps 2 15  -MP      Time 2 5sec  -MP      Additional Comments supine  -MP         Exercise 4    Exercise Name 4 SAP  -MP      Cueing 4 Verbal  -MP      Sets 4 2  -MP      Reps 4 20  -MP      Time 4 5#  -MP         Exercise 11    Exercise Name 11 Supine HA  -MP      Cueing 11 Verbal;Demo  -MP      Sets 11 2  -MP      Reps 11 10  -MP      Time 11 GTB  -MP         Exercise 13    Exercise Name 13 S/L open book  -MP      Cueing 13 Verbal;Demo  -MP      Reps 13 10  -MP      Time 13 5s  -MP                User Key  (r) = Recorded By, (t) = Taken By, (c) = Cosigned By      Initials Name Provider Type    Niya Cueva, PT Physical Therapist                             Manual Rx (Last 36 Hours)       Manual Treatments       Row Name 12/23/24 1100             Total Minutes    42866 - PT Manual Therapy Minutes 10  -MP         Manual Rx 1    Manual Rx 1 Location STM B UT/LS, suboccipital release, gentle c-spine traction  -MP      Manual Rx 1 Type supine  -MP                User Key  (r) = Recorded By, (t) = Taken By, (c) = Cosigned By      Initials Name Provider Type    MP Niya Stauffer, PT Physical  Therapist                     PT OP Goals       Row Name 12/23/24 1100          PT Short Term Goals    STG Date to Achieve 12/06/24  -MP     STG 1 Pt will be independent with initial HEP and postural awareness to improve pain and symptoms.  -MP     STG 1 Progress Met  -MP     STG 2 Pt will improve cervical rotation to 55 degrees bilaterally to improve ability to drive safely.  -MP     STG 2 Progress Goal Revised  -MP     STG 3 Pt will show improved postural awareness with appropriate form during functional activities to assist with picking up her kids.  -MP     STG 3 Progress Ongoing  -MP        Long Term Goals    LTG Date to Achieve 01/05/25  -MP     LTG 1 Pt will be independent with advance HEP and postural awareness for continued management of pain and symptoms.  -MP     LTG 1 Progress Ongoing  -MP     LTG 2 Pt will improve NDI perceived disability from 35/50 to <22/50 to improve overall quality of life.  -MP     LTG 2 Progress Ongoing  -MP     LTG 3 Pt will improve LE strength to >4/5 bilaterally without any reports of referred headaches to improve functional ability  -MP     LTG 3 Progress Ongoing  -MP     LTG 4 Pt will decrease neck pain from 7/10 to </= 2/10 to improve participation in ADLs.  -MP     LTG 4 Progress Ongoing  -MP     LTG 5 Patient will improve walking tolerance to >30 min without LBP or cervical pain to improve participation in community activities.  -MP     LTG 5 Progress Ongoing  -MP               User Key  (r) = Recorded By, (t) = Taken By, (c) = Cosigned By      Initials Name Provider Type    Niya Cueva, PT Physical Therapist                    Therapy Education  Given: Symptoms/condition management, Posture/body mechanics  Program: Reinforced  How Provided: Verbal, Demonstration  Provided to: Patient  Level of Understanding: Verbalized, Demonstrated              Time Calculation:   Start Time: 1133  Stop Time: 1211  Time Calculation (min): 38 min  Total Timed Code Minutes- PT: 38  minute(s)  Timed Charges  65066 - PT Therapeutic Exercise Minutes: 28  93954 - PT Manual Therapy Minutes: 10  Total Minutes  Timed Charges Total Minutes: 38   Total Minutes: 38  Therapy Charges for Today       Code Description Service Date Service Provider Modifiers Qty    [id number]  PT MANUAL THERAPY EA 15 MIN 12/23/2024 Niya Stauffer, PT GP 1    [id number]  PT THER PROC EA 15 MIN 12/23/2024 Niya Stauffer, PT GP 2                      Niya Stauffer, PT  12/23/2024

## 2024-12-27 ENCOUNTER — HOSPITAL ENCOUNTER (OUTPATIENT)
Dept: PHYSICAL THERAPY | Facility: HOSPITAL | Age: 29
Setting detail: THERAPIES SERIES
Discharge: HOME | End: 2024-12-27
Payer: MEDICAID

## 2024-12-27 DIAGNOSIS — M54.2 CHRONIC NECK PAIN: ICD-10-CM

## 2024-12-27 DIAGNOSIS — M54.50 CHRONIC BILATERAL LOW BACK PAIN WITHOUT SCIATICA: ICD-10-CM

## 2024-12-27 DIAGNOSIS — G89.29 CHRONIC BILATERAL LOW BACK PAIN WITHOUT SCIATICA: ICD-10-CM

## 2024-12-27 DIAGNOSIS — G89.29 CHRONIC NECK PAIN: ICD-10-CM

## 2024-12-27 DIAGNOSIS — M54.2 NECK PAIN: Primary | ICD-10-CM

## 2024-12-27 PROCEDURE — 97110 THERAPEUTIC EXERCISES: CPT

## 2024-12-27 NOTE — THERAPY TREATMENT NOTE
Outpatient Physical Therapy Ortho Treatment Note  [facility]     Patient Name: Kelly Stevens  : 1995  MRN: [id number]  Today's Date: 2024      Visit Date: 2024    Visit Dx:    ICD-10-CM ICD-9-CM   1. Neck pain  M54.2 723.1   2. Chronic neck pain  M54.2 723.1    G89.29 338.29   3. Chronic bilateral low back pain without sciatica  M54.50 724.2    G89.29 338.29       Patient Active Problem List   Diagnosis     (normal spontaneous vaginal delivery)    Mild intermittent asthma without complication    Thyromegaly    Environmental and seasonal allergies    Elevated liver enzymes    Arthralgia    Memory changes    Asthma    Chronic nasal congestion    Migraine without aura and without status migrainosus, not intractable    Family history of autoimmune disorder    Immunization due    Polyarthralgia    Chronic midline low back pain without sciatica    Chronic neck pain    Fibromyalgia    Facet arthritis of cervical region    Facet arthritis of lumbar region    Dextroscoliosis of lumbar spine        Past Medical History:   Diagnosis Date    Asthma     Cluster headache     Encounter for preventive health examination 2024    Encounter to establish care 2024    Headache, tension-type     Ingrown toenail of left foot 2024    Memory loss     Migraine     Wears glasses         Past Surgical History:   Procedure Laterality Date    NOSE SURGERY  2016    WISDOM TOOTH EXTRACTION  10/2019                        PT Assessment/Plan       Row Name 24 1200          PT Assessment    Assessment Comments Ms. Stevens arrives to PT reporting LBP > neck pain today. Therefore, initiated session with gentle mobility interventions and then alternated exercises targeting neck/back to allow for active rest breaks. She tolerated addition of qped chin tucks and alt shoulder extension without complaint of increased pain. She benefited from cues to maintain neutral cervical position with shoulder  row/ext. Kelly Stevens remains a candidate for skilled PT.  -JS        PT Plan    PT Plan Comments consider side stepping, step up + knee   -JS               User Key  (r) = Recorded By, (t) = Taken By, (c) = Cosigned By      Initials Name Provider Type    JS Jeanie Wheatley, PT Physical Therapist                       OP Exercises       Row Name 12/27/24 1100             Subjective    Subjective Comments My low back is bothering my more today  -JS         Subjective Pain    Able to rate subjective pain? yes  -JS      Pre-Treatment Pain Level 7  -JS      Post-Treatment Pain Level 7  reports same pain level but expresses less tightness  -JS         Total Minutes    48753 - PT Therapeutic Exercise Minutes 40  -JS         Exercise 1    Exercise Name 1 NuStep  -JS      Cueing 1 Verbal  -JS      Time 1 5 min  -JS         Exercise 2    Exercise Name 2 Chin tucks + lift off  -JS      Cueing 2 Verbal;Demo  -JS      Reps 2 15  -JS      Time 2 5sec  -JS      Additional Comments supine  -JS         Exercise 4    Exercise Name 4 supine piriformis stretch  -JS      Cueing 4 Verbal;Tactile  -JS      Reps 4 3  -JS      Time 4 20s  -JS         Exercise 5    Exercise Name 5 LTRs  -JS      Cueing 5 Verbal;Demo  -JS      Sets 5 1  -JS      Reps 5 15  -JS      Time 5 5s  -JS         Exercise 6    Exercise Name 6 PPT + LE extension  -JS      Cueing 6 Verbal;Demo  -JS      Reps 6 10ea  -JS      Time 6 5s  -JS         Exercise 7    Exercise Name 7 beginner bridge + PPT  -JS      Cueing 7 Verbal  -JS      Sets 7 2  -JS      Reps 7 10  -JS      Time 7 3sec at top  -JS      Additional Comments LE over swiss ball  -JS         Exercise 10    Exercise Name 10 sidelying clamshell  -JS      Cueing 10 Verbal;Demo  -JS      Sets 10 2  -JS      Reps 10 10  -JS      Time 10 RTB  -JS         Exercise 11    Exercise Name 11 Supine HA  -JS      Cueing 11 Verbal;Demo  -JS      Sets 11 2  -JS      Reps 11 10  -JS      Time 11 GTB  -JS          Exercise 12    Exercise Name 12 qped chin tuck  -JS      Cueing 12 Verbal;Demo  -JS      Reps 12 2x10  -JS         Exercise 13    Exercise Name 13 S/L open book  -JS      Cueing 13 Verbal;Demo  -JS      Reps 13 10  -JS      Time 13 5s  -JS         Exercise 14    Exercise Name 14 cat/cow  -JS      Cueing 14 Verbal;Demo  -JS      Reps 14 10ea  -JS      Time 14 5s  -JS         Exercise 15    Exercise Name 15 A-R  -JS      Cueing 15 Verbal;Demo  -JS      Sets 15 2  -JS      Reps 15 10ea  -JS      Time 15 RTB  -JS         Exercise 16    Exercise Name 16 alt shoulder ext  -JS      Cueing 16 Verbal;Demo  -JS      Sets 16 2  -JS      Reps 16 10e  -JS      Time 16 RTB  -JS                User Key  (r) = Recorded By, (t) = Taken By, (c) = Cosigned By      Initials Name Provider Type    Jeanie Montero, PT Physical Therapist                                  PT OP Goals       Row Name 12/27/24 1100          PT Short Term Goals    STG Date to Achieve 12/06/24  -     STG 1 Pt will be independent with initial HEP and postural awareness to improve pain and symptoms.  -     STG 1 Progress Met  -JS     STG 2 Pt will improve cervical rotation to 55 degrees bilaterally to improve ability to drive safely.  -     STG 2 Progress Goal Revised  -     STG 3 Pt will show improved postural awareness with appropriate form during functional activities to assist with picking up her kids.  -     STG 3 Progress Ongoing  -        Long Term Goals    LTG Date to Achieve 01/05/25  -     LTG 1 Pt will be independent with advance HEP and postural awareness for continued management of pain and symptoms.  -     LTG 1 Progress Ongoing  -JS     LTG 2 Pt will improve NDI perceived disability from 35/50 to <22/50 to improve overall quality of life.  -     LTG 2 Progress Ongoing  -JS     LTG 3 Pt will improve LE strength to >4/5 bilaterally without any reports of referred headaches to improve functional ability  -JS     LTG 3 Progress  Ongoing  -     LTG 4 Pt will decrease neck pain from 7/10 to </= 2/10 to improve participation in ADLs.  -     LTG 4 Progress Ongoing  -     LTG 5 Patient will improve walking tolerance to >30 min without LBP or cervical pain to improve participation in community activities.  -     LTG 5 Progress Ongoing  -               User Key  (r) = Recorded By, (t) = Taken By, (c) = Cosigned By      Initials Name Provider Type    Jeanie Montero, PT Physical Therapist                                   Time Calculation:   Start Time: 1105  Stop Time: 1145  Time Calculation (min): 40 min  Timed Charges  22432 - PT Therapeutic Exercise Minutes: 40  Total Minutes  Timed Charges Total Minutes: 40   Total Minutes: 40  Therapy Charges for Today       Code Description Service Date Service Provider Modifiers Qty    75490103558  PT THER PROC EA 15 MIN 12/27/2024 Jeanie Wheatley, PT GP 3                      Jeanie Wheatley, MAIA  12/27/2024

## 2024-12-30 DIAGNOSIS — J45.20 MILD INTERMITTENT ASTHMA WITHOUT COMPLICATION: Chronic | ICD-10-CM

## 2024-12-30 RX ORDER — BUDESONIDE AND FORMOTEROL FUMARATE DIHYDRATE 80; 4.5 UG/1; UG/1
2 AEROSOL RESPIRATORY (INHALATION)
Qty: 10.2 G | Refills: 5 | Status: SHIPPED | OUTPATIENT
Start: 2024-12-30

## 2024-12-30 RX ORDER — ALBUTEROL SULFATE 90 UG/1
2 INHALANT RESPIRATORY (INHALATION) EVERY 4 HOURS PRN
Qty: 18 G | Refills: 5 | Status: SHIPPED | OUTPATIENT
Start: 2024-12-30

## 2024-12-31 ENCOUNTER — HOSPITAL ENCOUNTER (OUTPATIENT)
Dept: PHYSICAL THERAPY | Facility: HOSPITAL | Age: 29
Setting detail: THERAPIES SERIES
Discharge: HOME | End: 2024-12-31
Payer: MEDICAID

## 2024-12-31 DIAGNOSIS — M54.50 CHRONIC BILATERAL LOW BACK PAIN WITHOUT SCIATICA: ICD-10-CM

## 2024-12-31 DIAGNOSIS — M54.2 NECK PAIN: Primary | ICD-10-CM

## 2024-12-31 DIAGNOSIS — G89.29 CHRONIC BILATERAL LOW BACK PAIN WITHOUT SCIATICA: ICD-10-CM

## 2024-12-31 DIAGNOSIS — G89.29 CHRONIC NECK PAIN: ICD-10-CM

## 2024-12-31 DIAGNOSIS — M54.2 CHRONIC NECK PAIN: ICD-10-CM

## 2024-12-31 PROCEDURE — 97110 THERAPEUTIC EXERCISES: CPT

## 2024-12-31 NOTE — THERAPY TREATMENT NOTE
Outpatient Physical Therapy Ortho Treatment Note  [facility]     Patient Name: Kelly Stevens  : 1995  MRN: [id number]  Today's Date: 2024      Visit Date: 2024    Visit Dx:    ICD-10-CM ICD-9-CM   1. Neck pain  M54.2 723.1   2. Chronic neck pain  M54.2 723.1    G89.29 338.29   3. Chronic bilateral low back pain without sciatica  M54.50 724.2    G89.29 338.29       Patient Active Problem List   Diagnosis     (normal spontaneous vaginal delivery)    Mild intermittent asthma without complication    Thyromegaly    Environmental and seasonal allergies    Elevated liver enzymes    Arthralgia    Memory changes    Asthma    Chronic nasal congestion    Migraine without aura and without status migrainosus, not intractable    Family history of autoimmune disorder    Immunization due    Polyarthralgia    Chronic midline low back pain without sciatica    Chronic neck pain    Fibromyalgia    Facet arthritis of cervical region    Facet arthritis of lumbar region    Dextroscoliosis of lumbar spine        Past Medical History:   Diagnosis Date    Asthma     Cluster headache     Encounter for preventive health examination 2024    Encounter to establish care 2024    Headache, tension-type     Ingrown toenail of left foot 2024    Memory loss     Migraine     Wears glasses         Past Surgical History:   Procedure Laterality Date    NOSE SURGERY  2016    WISDOM TOOTH EXTRACTION  10/2019                        PT Assessment/Plan       Row Name 24 1100          PT Assessment    Assessment Comments Ms. Stevens arrives to PT reporting muscle soreness as she feels she over did her HEP. Initiated session with gentle stretches/chin tucks to improve soreness. She reports LBP > neck pain this AM. Deferred supine LE strengthening exercises to HEP only. Added supine B shoulder ER, lateral stepping and step up + knee drivers. Updated HEP due to potential gap in appts, reviewed changes  with patient and provided handout. Kelly Stevens remains a candidate for skilled PT.  -JS        PT Plan    PT Plan Comments consider squat chops to target mechanics  -JS               User Key  (r) = Recorded By, (t) = Taken By, (c) = Cosigned By      Initials Name Provider Type    Jeanie Montero, PT Physical Therapist                       OP Exercises       Row Name 12/31/24 1000             Subjective    Subjective Comments I am a little sore today because I think I overdid my HEP a little  -JS         Subjective Pain    Able to rate subjective pain? yes  -JS      Pre-Treatment Pain Level 7  -JS         Total Minutes    85112 - PT Therapeutic Exercise Minutes 41  -JS         Exercise 1    Exercise Name 1 NuStep  -JS      Cueing 1 Verbal  -JS      Time 1 5 min  -JS         Exercise 2    Exercise Name 2 Chin tucks + lift off  -JS      Cueing 2 Verbal;Demo  -JS      Reps 2 15  -JS      Time 2 5sec  -JS      Additional Comments supine  -JS         Exercise 3    Exercise Name 3 Chin tucks w/ rotation  -JS      Cueing 3 Verbal;Demo  -JS      Sets 3 2  -JS      Reps 3 10  -JS      Time 3 5s  -JS         Exercise 4    Exercise Name 4 supine piriformis stretch  -JS      Cueing 4 Verbal;Tactile  -JS      Reps 4 3  -JS      Time 4 20s  -JS         Exercise 9    Exercise Name 9 supine B shoulder ER  -JS      Cueing 9 Verbal;Demo  -JS      Sets 9 2  -JS      Reps 9 10  -JS      Time 9 GTB  -JS         Exercise 11    Exercise Name 11 Supine HA  -JS      Cueing 11 Verbal;Demo  -JS      Sets 11 2  -JS      Reps 11 10  -JS      Time 11 GTB  -JS         Exercise 12    Exercise Name 12 qped chin tuck  -JS      Cueing 12 Verbal;Demo  -JS      Reps 12 2x10  -JS         Exercise 15    Exercise Name 15 A-R  -JS      Cueing 15 Verbal;Demo  -JS      Sets 15 2  -JS      Reps 15 10ea  -JS      Time 15 RTB  -JS         Exercise 16    Exercise Name 16 alt shoulder ext  -JS      Cueing 16 Verbal;Demo  -JS      Sets 16 2  -JS       Reps 16 10e  -JS      Time 16 RTB  -JS         Exercise 17    Exercise Name 17 lateral stepping  -JS      Cueing 17 Verbal;Demo  -JS      Reps 17 3 laps  -JS      Time 17 RTB below knees  -JS         Exercise 18    Exercise Name 18 step up + knee   -JS      Cueing 18 Verbal;Demo  -JS      Sets 18 2  -JS      Reps 18 10e  -JS      Time 18 6 in  -JS      Additional Comments with TA  -                User Key  (r) = Recorded By, (t) = Taken By, (c) = Cosigned By      Initials Name Provider Type    Jeanie Montero, PT Physical Therapist                                  PT OP Goals       Row Name 12/31/24 1000          PT Short Term Goals    STG Date to Achieve 12/06/24  -     STG 1 Pt will be independent with initial HEP and postural awareness to improve pain and symptoms.  -JS     STG 1 Progress Met  -     STG 2 Pt will improve cervical rotation to 55 degrees bilaterally to improve ability to drive safely.  -     STG 2 Progress Goal Revised  -     STG 3 Pt will show improved postural awareness with appropriate form during functional activities to assist with picking up her kids.  -     STG 3 Progress Ongoing  -        Long Term Goals    LTG Date to Achieve 01/05/25  -     LTG 1 Pt will be independent with advance HEP and postural awareness for continued management of pain and symptoms.  -JS     LTG 1 Progress Ongoing  -JS     LTG 2 Pt will improve NDI perceived disability from 35/50 to <22/50 to improve overall quality of life.  -     LTG 2 Progress Ongoing  -JS     LTG 3 Pt will improve LE strength to >4/5 bilaterally without any reports of referred headaches to improve functional ability  -     LTG 3 Progress Ongoing  -     LTG 4 Pt will decrease neck pain from 7/10 to </= 2/10 to improve participation in ADLs.  -     LTG 4 Progress Ongoing  -     LTG 5 Patient will improve walking tolerance to >30 min without LBP or cervical pain to improve participation in community activities.   -RAFAEL     LTG 5 Progress Ongoing  -RAFAEL               User Key  (r) = Recorded By, (t) = Taken By, (c) = Cosigned By      Initials Name Provider Type    Jeanie Montero, PT Physical Therapist                    Therapy Education  Education Details: updated HEP  Given: HEP, Symptoms/condition management, Pain management, Posture/body mechanics  Program: Reinforced, Progressed  How Provided: Verbal, Demonstration, Written  Provided to: Patient  Level of Understanding: Verbalized, Demonstrated              Time Calculation:   Start Time: 1019  Stop Time: 1100  Time Calculation (min): 41 min  Timed Charges  78942 - PT Therapeutic Exercise Minutes: 41  Total Minutes  Timed Charges Total Minutes: 41   Total Minutes: 41  Therapy Charges for Today       Code Description Service Date Service Provider Modifiers Qty    82219010370 HC PT THER PROC EA 15 MIN 12/31/2024 Jeanie Wheatley, PT GP 3                      Jeanie Wheatley, MAIA  12/31/2024

## 2025-01-10 ENCOUNTER — OFFICE VISIT (OUTPATIENT)
Dept: NEUROLOGY | Facility: CLINIC | Age: 30
End: 2025-01-10
Payer: MEDICAID

## 2025-01-10 ENCOUNTER — TELEPHONE (OUTPATIENT)
Dept: NEUROLOGY | Facility: CLINIC | Age: 30
End: 2025-01-10

## 2025-01-10 VITALS
HEIGHT: 65 IN | OXYGEN SATURATION: 98 % | SYSTOLIC BLOOD PRESSURE: 120 MMHG | DIASTOLIC BLOOD PRESSURE: 84 MMHG | WEIGHT: 193 LBS | HEART RATE: 92 BPM | BODY MASS INDEX: 32.15 KG/M2

## 2025-01-10 DIAGNOSIS — R42 DIZZINESS: ICD-10-CM

## 2025-01-10 DIAGNOSIS — R41.3 MEMORY LOSS: ICD-10-CM

## 2025-01-10 DIAGNOSIS — G43.009 MIGRAINE WITHOUT AURA AND WITHOUT STATUS MIGRAINOSUS, NOT INTRACTABLE: Primary | ICD-10-CM

## 2025-01-10 DIAGNOSIS — M54.2 NECK PAIN: ICD-10-CM

## 2025-01-10 RX ORDER — AMITRIPTYLINE HYDROCHLORIDE 10 MG/1
TABLET ORAL
Qty: 134 TABLET | Refills: 0 | Status: SHIPPED | OUTPATIENT
Start: 2025-01-10 | End: 2025-03-25

## 2025-01-10 RX ORDER — ONDANSETRON 4 MG/1
4 TABLET, ORALLY DISINTEGRATING ORAL EVERY 8 HOURS PRN
Qty: 20 TABLET | Refills: 5 | Status: SHIPPED | OUTPATIENT
Start: 2025-01-10

## 2025-01-10 RX ORDER — NORETHINDRONE 0.35 MG/1
TABLET ORAL
COMMUNITY
Start: 2024-12-12

## 2025-01-10 RX ORDER — SUMATRIPTAN 50 MG/1
50 TABLET, FILM COATED ORAL AS NEEDED
Qty: 12 TABLET | Refills: 5 | Status: SHIPPED | OUTPATIENT
Start: 2025-01-10 | End: 2025-07-09
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
## 2025-01-10 NOTE — PROGRESS NOTES
Chief Complaint  Migraine (Accompanied by  ([facility]) About 2-3 migraines a month - Tremors have decreased but are still there. )    Subjective     {CC  Problem List  Visit Diagnosis   Encounters  Notes  Medications  Labs  Result Review Imaging  Media :23}     Kelly Stevens presents to [facility] NEUROLOGY for   HISTORY OF PRESENT ILLNESS:    Past Medical History:   Diagnosis Date   • Asthma    • Cluster headache    • Encounter for preventive health examination 04/16/2024   • Encounter to establish care 04/16/2024   • Headache, tension-type    • Ingrown toenail of left foot 04/16/2024   • Memory loss    • Migraine    • Wears glasses         Family History   Problem Relation Age of Onset   • Hypertension Father    • No Known Problems Mother    • Uterine cancer Maternal Grandmother    • Diabetes Maternal Grandmother    • Colon cancer Maternal Grandfather    • Breast cancer Neg Hx    • Ovarian cancer Neg Hx         Social History     Socioeconomic History   • Marital status: Single   Tobacco Use   • Smoking status: Never   • Smokeless tobacco: Never   Vaping Use   • Vaping status: Never Used   Substance and Sexual Activity   • Alcohol use: No   • Drug use: No   • Sexual activity: Yes     Partners: Male     Birth control/protection: None        ***  Review of Systems   Neurological:  Positive for headache. Negative for dizziness, tremors, seizures, syncope, facial asymmetry, speech difficulty, weakness, light-headedness, numbness, memory problem and confusion.   Psychiatric/Behavioral:  Negative for agitation, behavioral problems, decreased concentration, dysphoric mood, hallucinations, self-injury, sleep disturbance, suicidal ideas, negative for hyperactivity, depressed mood and stress. The patient is not nervous/anxious.       Objective   Vital Signs:   There were no vitals taken for this visit.      PHYSICAL EXAM:  ***       Result Review :{ Labs  Result Review  Imaging  Med Tab   Media :23}   {The following data was reviewed by (Optional):04598}  {Ambulatory Labs (Optional):58993}  {Data reviewed (Optional):90619:::1}          Assessment and Plan {CC Problem List  Visit Diagnosis  ROS  Review (Popup)  Health Maintenance  Quality  BestPractice  Medications  SmartSets  SnapShot Encounters  Media :23}   Problem List Items Addressed This Visit    None      {Time Spent (Optional):37818}    Follow Up {Instructions Charge Capture  Follow-up Communications :23}  No follow-ups on file.  Patient was given instructions and counseling regarding her condition or for health maintenance advice. Please see specific information pulled into the AVS if appropriate.

## 2025-01-10 NOTE — TELEPHONE ENCOUNTER
ROSALINA WITH [facility]    [facility] [id number] - [place], [place] - [address] AT [facility]. - [phone]  - [phone]   [address] [place] [address], [facility] [address]  Phone: [phone]  Fax: [phone]     CALLING REGARDING DOSE INSTRUCTIONS FOR  [facility]    PLEASE CALL HIM -[phone]

## 2025-01-15 ENCOUNTER — APPOINTMENT (OUTPATIENT)
Dept: PHYSICAL THERAPY | Facility: HOSPITAL | Age: 30
End: 2025-01-15
Payer: MEDICAID

## 2025-01-17 ENCOUNTER — HOSPITAL ENCOUNTER (OUTPATIENT)
Dept: PHYSICAL THERAPY | Facility: HOSPITAL | Age: 30
Setting detail: THERAPIES SERIES
Discharge: HOME | End: 2025-01-17
Payer: MEDICAID

## 2025-01-17 ENCOUNTER — TRANSCRIBE ORDERS (OUTPATIENT)
Dept: PHYSICAL THERAPY | Facility: HOSPITAL | Age: 30
End: 2025-01-17
Payer: MEDICAID

## 2025-01-17 DIAGNOSIS — G89.29 CHRONIC NECK PAIN: ICD-10-CM

## 2025-01-17 DIAGNOSIS — M54.2 CHRONIC NECK PAIN: ICD-10-CM

## 2025-01-17 DIAGNOSIS — M54.2 NECK PAIN: Primary | ICD-10-CM

## 2025-01-17 DIAGNOSIS — M54.50 CHRONIC BILATERAL LOW BACK PAIN WITHOUT SCIATICA: ICD-10-CM

## 2025-01-17 DIAGNOSIS — G89.29 CHRONIC BILATERAL LOW BACK PAIN WITHOUT SCIATICA: ICD-10-CM

## 2025-01-17 DIAGNOSIS — M54.50 CHRONIC MIDLINE LOW BACK PAIN WITHOUT SCIATICA: Primary | ICD-10-CM

## 2025-01-17 DIAGNOSIS — G89.29 CHRONIC MIDLINE LOW BACK PAIN WITHOUT SCIATICA: Primary | ICD-10-CM

## 2025-01-17 PROCEDURE — 97110 THERAPEUTIC EXERCISES: CPT

## 2025-01-17 NOTE — THERAPY PROGRESS REPORT/RE-CERT
Outpatient Physical Therapy Ortho Progress Note  [facility]     Patient Name: Kelly Stevens  : 1995  MRN: [id number]  Today's Date: 2025      Visit Date: 2025    Visit Dx:    ICD-10-CM ICD-9-CM   1. Neck pain  M54.2 723.1   2. Chronic neck pain  M54.2 723.1    G89.29 338.29   3. Chronic bilateral low back pain without sciatica  M54.50 724.2    G89.29 338.29       Patient Active Problem List   Diagnosis     (normal spontaneous vaginal delivery)    Mild intermittent asthma without complication    Thyromegaly    Environmental and seasonal allergies    Elevated liver enzymes    Arthralgia    Memory changes    Asthma    Chronic nasal congestion    Migraine without aura and without status migrainosus, not intractable    Family history of autoimmune disorder    Immunization due    Polyarthralgia    Chronic midline low back pain without sciatica    Chronic neck pain    Fibromyalgia    Facet arthritis of cervical region    Facet arthritis of lumbar region    Dextroscoliosis of lumbar spine        Past Medical History:   Diagnosis Date    Asthma     Cluster headache     Encounter for preventive health examination 2024    Encounter to establish care 2024    Headache, tension-type     Ingrown toenail of left foot 2024    Memory loss     Migraine     Wears glasses         Past Surgical History:   Procedure Laterality Date    NOSE SURGERY  2016    WISDOM TOOTH EXTRACTION  10/2019        PT Ortho       Row Name 25 1000       Myotomal Screen- Lower Quarter Clearing    Hip flexion (L2) Bilateral:;4+ (Good +)  -MP    Knee extension (L3) Bilateral:;4 (Good)  -MP    Ankle DF (L4) Bilateral:;5 (Normal)  -MP       Head/Neck/Trunk    Neck Lt Rotation AROM 0-54  -MP    Neck Rt Rotation AROM 0-46  -MP       MMT Right Lower Ext    Rt Hip ABduction MMT, Gross Movement (3+/5) fair plus  -MP       MMT Left Lower Ext    Lt Hip ABduction MMT, Gross Movement (3+/5) fair plus  -MP               User Key  (r) = Recorded By, (t) = Taken By, (c) = Cosigned By      Initials Name Provider Type    MP Niya Stauffer, PT Physical Therapist                                 PT Assessment/Plan       Row Name 01/17/25 1100          PT Assessment    Functional Limitations Limitation in home management;Limitations in community activities;Limitations in functional capacity and performance;Performance in self-care ADL  -MP     Impairments Endurance;Joint mobility;Muscle strength;Pain;Poor body mechanics;Posture;Range of motion  -MP     Assessment Comments Kelly Stevens has been seen for 9 physical therapy sessions for chronic cervical and lumbar pain. Treatment has included therapeutic exercise, manual therapy, and patient education with home exercise program . Progress to physical therapy goals is slow as pt. Has met 1/3 STGs, and 1/5 LTGs. She reports increased LBP over more recent weeks with cervical pain remaining relatively unchanged. Overall pt. Has reported minimal change in condition, with pain levels remaining at moderate to high levels in both cervical and lumbar spine. Pt. Does report mild improvement when performing exercises/HEP for temporary pain relief. Minimal change in LE strength MMT, however, did notice slight improvement in cervical rotation AROM. She has 2 remaining skilled PT sessions at which time anticipate D/C to independent management secondary to independence with program and minimal change in overall condition.  -MP     Please refer to paper survey for additional self-reported information Yes  -MP     Rehab Potential Good  -MP     Patient/caregiver participated in establishment of treatment plan and goals Yes  -MP     Patient would benefit from skilled therapy intervention Yes  -MP        PT Plan    PT Frequency 1x/week;2x/week  -MP     Predicted Duration of Therapy Intervention (PT) 4 visits  -MP     Planned CPT's? PT RE-EVAL: 35284;PT THER PROC EA 15 MIN: 53986;PT THER ACT EA 15  MIN: 90846;PT MANUAL THERAPY EA 15 MIN: 95442;PT NEUROMUSC RE-EDUCATION EA 15 MIN: 32591;PT GAIT TRAINING EA 15 MIN: 14131;PT SELF CARE/HOME MGMT/TRAIN EA 15: 03370;PT HOT OR COLD PACK TREAT MCARE;PT ELECTRICAL STIM UNATTEND: ;PT ELECTRICAL STIM ATTD EA 15 MIN: 24869;PT EVAL AQUA: 74188;PT AQUATIC THERAPY EA 15 MIN: 91350;PT ULTRASOUND EA 15 MIN: 13613;PT TRACTION CERVICAL: 74067;PT TRACTION LUMBAR: 68861  -MP     PT Plan Comments squat chops? plan to D/C after remaining visits so begin to finalize HEP  -MP               User Key  (r) = Recorded By, (t) = Taken By, (c) = Cosigned By      Initials Name Provider Type    Niya Cueva, PT Physical Therapist                       OP Exercises       Row Name 01/17/25 1000             Subjective    Subjective Comments My back has really been hurting, the rest of everything else is about the same  -MP         Subjective Pain    Able to rate subjective pain? yes  -MP      Pre-Treatment Pain Level 8  -MP      Post-Treatment Pain Level 8  -MP         Total Minutes    80389 - PT Therapeutic Exercise Minutes 38  -MP         Exercise 1    Exercise Name 1 NuStep  -MP      Cueing 1 Verbal  -MP      Time 1 5 min  -MP         Exercise 4    Exercise Name 4 seated piriformis/fig4 stretch  -MP      Cueing 4 Verbal;Demo  -MP      Reps 4 3ea  -MP      Time 4 20sec  -MP         Exercise 9    Exercise Name 9 seated B ER  -MP      Cueing 9 Verbal;Demo  -MP      Sets 9 2  -MP      Reps 9 10  -MP      Time 9 GTB  -MP         Exercise 11    Exercise Name 11 seated HA  -MP      Cueing 11 Verbal;Demo  -MP      Sets 11 2  -MP      Reps 11 10  -MP      Time 11 GTB  -MP         Exercise 13    Exercise Name 13 progress note; goals update  -MP         Exercise 15    Exercise Name 15 A-R  -MP      Cueing 15 Verbal;Demo  -MP      Sets 15 2  -MP      Reps 15 10ea  -MP      Time 15 RTB  -MP         Exercise 16    Exercise Name 16 alt shoulder ext  -MP      Cueing 16 Verbal;Demo  -MP      Sets  16 2  -MP      Reps 16 10e  -MP      Time 16 RTB  -MP         Exercise 17    Exercise Name 17 lateral stepping  -MP      Cueing 17 Verbal;Demo  -MP      Reps 17 3 laps  -MP      Time 17 RTB below knees  -MP                User Key  (r) = Recorded By, (t) = Taken By, (c) = Cosigned By      Initials Name Provider Type    MP Niya Stauffer, PT Physical Therapist                                  PT OP Goals       Row Name 01/17/25 1000          PT Short Term Goals    STG Date to Achieve 12/06/24  -MP     STG 1 Pt will be independent with initial HEP and postural awareness to improve pain and symptoms.  -MP     STG 1 Progress Met  -MP     STG 2 Pt will improve cervical rotation to 55 degrees bilaterally to improve ability to drive safely.  -MP     STG 2 Progress Ongoing  -MP     STG 2 Progress Comments see ortho  -MP     STG 3 Pt will show improved postural awareness with appropriate form during functional activities to assist with picking up her kids.  -MP     STG 3 Progress Ongoing  -MP     STG 3 Progress Comments transitioning to more standing ther ex to address functional movement patterns  -MP        Long Term Goals    LTG Date to Achieve 01/05/25  -MP     LTG 1 Pt will be independent with advance HEP and postural awareness for continued management of pain and symptoms.  -MP     LTG 1 Progress Met  -MP     LTG 1 Progress Comments reports compliance  -MP     LTG 2 Pt will improve NDI perceived disability from 35/50 to <22/50 to improve overall quality of life.  -MP     LTG 2 Progress Ongoing  -MP     LTG 3 Pt will improve LE strength to >4/5 bilaterally without any reports of referred headaches to improve functional ability  -MP     LTG 3 Progress Ongoing  -MP     LTG 3 Progress Comments see ortho  -MP     LTG 4 Pt will decrease neck pain from 7/10 to </= 2/10 to improve participation in ADLs.  -MP     LTG 4 Progress Ongoing  -MP     LTG 4 Progress Comments 7/10 at highest  -MP     LTG 5 Patient will improve  walking tolerance to >30 min without LBP or cervical pain to improve participation in community activities.  -MP     LTG 5 Progress Ongoing  -MP     LTG 5 Progress Comments 20-30 minutes  -MP               User Key  (r) = Recorded By, (t) = Taken By, (c) = Cosigned By      Initials Name Provider Type    Niya Cueva PT Physical Therapist                    Therapy Education  Given: Symptoms/condition management  Program: Reinforced  How Provided: Verbal, Demonstration  Provided to: Patient  Level of Understanding: Verbalized, Demonstrated    Outcome Measure Options: Neck Disability Index (NDI), Modified Oswestry  Neck Disability Index  Section 1 - Pain Intensity: The pain is very severe but comes and goes.  Section 2 - Personal Care: I need some help but manage most of my personal care.  Section 3 - Lifting: I can lift very light weights.  Section 4 - Reading: I cannot read as much as I want because of moderate neck pain.  Section 5 - Headaches: I have severe headaches that come frequently.  Section 6 - Concentration: I have a great deal of difficulty in concentrating when I want to.  Section 7 - Work: I cannot do my usual work.  Section 8 - Driving: I cannot drive my car as long as I want because of moderate neck pain.  Section 9 - Sleeping: My sleep is greatly disturbed (3-5 hours sleepless).  Section 10 - Recreation: I can hardly do any recreational activities because of pain in my neck.  Neck Disability Index Score: 36      Time Calculation:   Start Time: 1045  Stop Time: 1123  Time Calculation (min): 38 min  Total Timed Code Minutes- PT: 38 minute(s)  Timed Charges  60818 - PT Therapeutic Exercise Minutes: 38  Total Minutes  Timed Charges Total Minutes: 38   Total Minutes: 38  Therapy Charges for Today       Code Description Service Date Service Provider Modifiers Qty    26401488563 HC PT THER PROC EA 15 MIN 1/17/2025 Niya Stauffer, MAIA GP 3            PT G-Codes  Outcome Measure Options: Neck  Disability Index (NDI), Modified Oswestry  Neck Disability Index Score: 36         Niya Stauffer, PT  1/17/2025

## 2025-01-22 ENCOUNTER — APPOINTMENT (OUTPATIENT)
Dept: PHYSICAL THERAPY | Facility: HOSPITAL | Age: 30
End: 2025-01-22
Payer: MEDICAID

## 2025-01-22 ENCOUNTER — TELEPHONE (OUTPATIENT)
Dept: NEUROSURGERY | Facility: CLINIC | Age: 30
End: 2025-01-22
Payer: MEDICAID

## 2025-01-22 NOTE — TELEPHONE ENCOUNTER
Spoke w/ patient to get rescheduled. Patient now has an appointment on 02/14/2025 @ 1pm w/ Martina MARTINEZ.

## 2025-01-24 ENCOUNTER — HOSPITAL ENCOUNTER (OUTPATIENT)
Dept: PHYSICAL THERAPY | Facility: HOSPITAL | Age: 30
Setting detail: THERAPIES SERIES
Discharge: HOME | End: 2025-01-24
Payer: MEDICAID

## 2025-01-24 DIAGNOSIS — G89.29 CHRONIC NECK PAIN: ICD-10-CM

## 2025-01-24 DIAGNOSIS — G89.29 CHRONIC BILATERAL LOW BACK PAIN WITHOUT SCIATICA: ICD-10-CM

## 2025-01-24 DIAGNOSIS — M54.50 CHRONIC BILATERAL LOW BACK PAIN WITHOUT SCIATICA: ICD-10-CM

## 2025-01-24 DIAGNOSIS — M54.2 NECK PAIN: Primary | ICD-10-CM

## 2025-01-24 DIAGNOSIS — M54.2 CHRONIC NECK PAIN: ICD-10-CM

## 2025-01-24 PROCEDURE — 97110 THERAPEUTIC EXERCISES: CPT

## 2025-01-24 NOTE — THERAPY DISCHARGE NOTE
Outpatient Physical Therapy Ortho Treatment Note/Discharge Summary  [facility]     Patient Name: Kelly Stevens  : 1995  MRN: [id number]  Today's Date: 2025      Visit Date: 2025    Visit Dx:    ICD-10-CM ICD-9-CM   1. Neck pain  M54.2 723.1   2. Chronic neck pain  M54.2 723.1    G89.29 338.29   3. Chronic bilateral low back pain without sciatica  M54.50 724.2    G89.29 338.29       Patient Active Problem List   Diagnosis     (normal spontaneous vaginal delivery)    Mild intermittent asthma without complication    Thyromegaly    Environmental and seasonal allergies    Elevated liver enzymes    Arthralgia    Memory changes    Asthma    Chronic nasal congestion    Migraine without aura and without status migrainosus, not intractable    Family history of autoimmune disorder    Immunization due    Polyarthralgia    Chronic midline low back pain without sciatica    Chronic neck pain    Fibromyalgia    Facet arthritis of cervical region    Facet arthritis of lumbar region    Dextroscoliosis of lumbar spine        Past Medical History:   Diagnosis Date    Asthma     Cluster headache     Encounter for preventive health examination 2024    Encounter to establish care 2024    Headache, tension-type     Ingrown toenail of left foot 2024    Memory loss     Migraine     Wears glasses         Past Surgical History:   Procedure Laterality Date    NOSE SURGERY  2016    WISDOM TOOTH EXTRACTION  10/2019                        PT Assessment/Plan       Row Name 25 1200          PT Assessment    Assessment Comments Kelly Stevens was seen for 10 physical therapy sessions for chronic cervical and lumbar pain. Treatment included therapeutic exercise, manual therapy, and patient education with home exercise program . Progress to physical therapy goals was slow as pt. Met 1/3 STGs and 1/5 LTGs. She reports mild, temporary improvement in pain with performance of HEP. Encouraged  continued performance of HEP for long-term benefit and strengthening. She was discharged to an independent HEP and provided patient education to self-manage condition.  -MP        PT Plan    PT Plan Comments D/C  -MP               User Key  (r) = Recorded By, (t) = Taken By, (c) = Cosigned By      Initials Name Provider Type    MP Niya Stauffer, PT Physical Therapist                         OP Exercises       Row Name 01/24/25 1000             Subjective    Subjective Comments no new complaints  -MP         Subjective Pain    Able to rate subjective pain? yes  -MP      Pre-Treatment Pain Level 7  -MP      Post-Treatment Pain Level 7  -MP         Total Minutes    65318 - PT Therapeutic Exercise Minutes 26  -MP         Exercise 1    Exercise Name 1 NuStep  -MP      Cueing 1 Verbal  -MP      Time 1 5 min  -MP         Exercise 4    Exercise Name 4 seated piriformis/fig4 stretch  -MP      Cueing 4 Verbal;Demo  -MP      Reps 4 3ea  -MP      Time 4 20sec  -MP         Exercise 9    Exercise Name 9 seated B ER  -MP      Cueing 9 Verbal;Demo  -MP      Sets 9 2  -MP      Reps 9 10  -MP      Time 9 GTB  -MP         Exercise 11    Exercise Name 11 seated HA  -MP      Cueing 11 Verbal;Demo  -MP      Sets 11 2  -MP      Reps 11 10  -MP      Time 11 GTB  -MP         Exercise 15    Exercise Name 15 A-R  -MP      Cueing 15 Verbal;Demo  -MP      Sets 15 2  -MP      Reps 15 10ea  -MP      Time 15 GTB  -MP         Exercise 16    Exercise Name 16 alt shoulder ext  -MP      Cueing 16 Verbal;Demo  -MP      Sets 16 2  -MP      Reps 16 10e  -MP      Time 16 GTB  -MP         Exercise 17    Exercise Name 17 lateral stepping  -MP      Cueing 17 Verbal;Demo  -MP      Reps 17 4 laps  -MP      Time 17 RTB below knees  -MP                User Key  (r) = Recorded By, (t) = Taken By, (c) = Cosigned By      Initials Name Provider Type    Niya Cueva, PT Physical Therapist                                    PT OP Goals       Row Name  01/24/25 1000          PT Short Term Goals    STG Date to Achieve 12/06/24  -MP     STG 1 Pt will be independent with initial HEP and postural awareness to improve pain and symptoms.  -MP     STG 1 Progress Met  -MP     STG 2 Pt will improve cervical rotation to 55 degrees bilaterally to improve ability to drive safely.  -MP     STG 2 Progress Not Met  -MP     STG 2 Progress Comments reassessed last visit  -MP     STG 3 Pt will show improved postural awareness with appropriate form during functional activities to assist with picking up her kids.  -MP     STG 3 Progress Partially Met  -MP     STG 3 Progress Comments worked toward transition to higher level standing challenges  -MP        Long Term Goals    LTG Date to Achieve 01/05/25  -MP     LTG 1 Pt will be independent with advance HEP and postural awareness for continued management of pain and symptoms.  -MP     LTG 1 Progress Met  -MP     LTG 2 Pt will improve NDI perceived disability from 35/50 to <22/50 to improve overall quality of life.  -MP     LTG 2 Progress Not Met  -MP     LTG 2 Progress Comments 36/50 last visit  -MP     LTG 3 Pt will improve LE strength to >4/5 bilaterally without any reports of referred headaches to improve functional ability  -MP     LTG 3 Progress Not Met  -MP     LTG 3 Progress Comments reassessed last visit  -MP     LTG 4 Pt will decrease neck pain from 7/10 to </= 2/10 to improve participation in ADLs.  -MP     LTG 4 Progress Not Met  -MP     LTG 4 Progress Comments 7/10  -MP     LTG 5 Patient will improve walking tolerance to >30 min without LBP or cervical pain to improve participation in community activities.  -MP     LTG 5 Progress Partially Met  -MP     LTG 5 Progress Comments 20-30 minutes  -MP               User Key  (r) = Recorded By, (t) = Taken By, (c) = Cosigned By      Initials Name Provider Type    Niya Cueva PT Physical Therapist                    Therapy Education  Education Details: Updated HEP  [id number]  Given: HEP, Symptoms/condition management  Program: Reinforced, Progressed  How Provided: Verbal, Demonstration, Written  Provided to: Patient  Level of Understanding: Verbalized, Demonstrated              Time Calculation:   Start Time: 1047  Stop Time: 1113  Time Calculation (min): 26 min  Total Timed Code Minutes- PT: 26 minute(s)  Timed Charges  77654 - PT Therapeutic Exercise Minutes: 26  Total Minutes  Timed Charges Total Minutes: 26   Total Minutes: 26  Therapy Charges for Today       Code Description Service Date Service Provider Modifiers Qty    94258567333 HC PT THER PROC EA 15 MIN 1/24/2025 Niya Stauffer, PT GP 2                         Niya Stauffer, PT  1/24/2025
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
## 2025-02-14 ENCOUNTER — OFFICE VISIT (OUTPATIENT)
Dept: NEUROSURGERY | Facility: CLINIC | Age: 30
End: 2025-02-14
Payer: MEDICAID

## 2025-02-14 VITALS
HEIGHT: 65 IN | SYSTOLIC BLOOD PRESSURE: 116 MMHG | BODY MASS INDEX: 31.82 KG/M2 | HEART RATE: 93 BPM | TEMPERATURE: 98.7 F | OXYGEN SATURATION: 98 % | DIASTOLIC BLOOD PRESSURE: 70 MMHG | WEIGHT: 191 LBS

## 2025-02-14 DIAGNOSIS — M79.7 FIBROMYALGIA: ICD-10-CM

## 2025-02-14 DIAGNOSIS — M54.50 CHRONIC BILATERAL LOW BACK PAIN WITHOUT SCIATICA: ICD-10-CM

## 2025-02-14 DIAGNOSIS — G89.29 CHRONIC BILATERAL LOW BACK PAIN WITHOUT SCIATICA: ICD-10-CM

## 2025-02-14 DIAGNOSIS — M54.81 BILATERAL OCCIPITAL NEURALGIA: ICD-10-CM

## 2025-02-14 DIAGNOSIS — M54.2 NECK PAIN: Primary | ICD-10-CM

## 2025-02-14 DIAGNOSIS — M46.1 SACROILIITIS: ICD-10-CM

## 2025-02-14 RX ORDER — METHOCARBAMOL 500 MG/1
500 TABLET, FILM COATED ORAL 4 TIMES DAILY
Qty: 20 TABLET | Refills: 0 | Status: SHIPPED | OUTPATIENT
Start: 2025-02-14

## 2025-03-10 RX ORDER — METHOCARBAMOL 500 MG/1
500 TABLET, FILM COATED ORAL NIGHTLY PRN
Qty: 90 TABLET | Refills: 0 | Status: SHIPPED | OUTPATIENT
Start: 2025-03-10

## 2025-03-10 NOTE — TELEPHONE ENCOUNTER
Caller: Kelly Stevens    Relationship: Self    Best call back number: [phone]    Requested Prescriptions:   Requested Prescriptions     Pending Prescriptions Disp Refills    methocarbamol (ROBAXIN) 500 MG tablet 20 tablet 0     Sig: Take 1 tablet by mouth 4 (Four) Times a Day.        Pharmacy where request should be sent: [facility] [id number] [address] AT [facility] [phone]  - [phone]     Last office visit with prescribing clinician: 12/16/2024   Last telemedicine visit with prescribing clinician: Visit date not found   Next office visit with prescribing clinician: 3/17/2025     Additional details provided by patient:     Does the patient have less than a 3 day supply:  [] Yes  [x] No    Would you like a call back once the refill request has been completed: [] Yes [x] No    If the office needs to give you a call back, can they leave a voicemail: [] Yes [x] No    Octavia Villeda Rep   03/10/25 10:42 EDT

## 2025-03-24 ENCOUNTER — OFFICE VISIT (OUTPATIENT)
Dept: FAMILY MEDICINE CLINIC | Facility: CLINIC | Age: 30
End: 2025-03-24
Payer: MEDICAID

## 2025-03-24 VITALS
SYSTOLIC BLOOD PRESSURE: 106 MMHG | WEIGHT: 190.8 LBS | HEIGHT: 65 IN | OXYGEN SATURATION: 97 % | DIASTOLIC BLOOD PRESSURE: 78 MMHG | TEMPERATURE: 98 F | BODY MASS INDEX: 31.79 KG/M2 | HEART RATE: 78 BPM

## 2025-03-24 DIAGNOSIS — M54.50 CHRONIC BILATERAL LOW BACK PAIN WITHOUT SCIATICA: Chronic | ICD-10-CM

## 2025-03-24 DIAGNOSIS — J45.20 MILD INTERMITTENT ASTHMA WITHOUT COMPLICATION: Primary | Chronic | ICD-10-CM

## 2025-03-24 DIAGNOSIS — J30.89 ENVIRONMENTAL AND SEASONAL ALLERGIES: ICD-10-CM

## 2025-03-24 DIAGNOSIS — G89.29 CHRONIC BILATERAL LOW BACK PAIN WITHOUT SCIATICA: Chronic | ICD-10-CM

## 2025-03-24 DIAGNOSIS — G43.009 MIGRAINE WITHOUT AURA AND WITHOUT STATUS MIGRAINOSUS, NOT INTRACTABLE: Chronic | ICD-10-CM

## 2025-03-24 PROBLEM — Z23 IMMUNIZATION DUE: Status: RESOLVED | Noted: 2024-10-16 | Resolved: 2025-03-24

## 2025-03-24 PROCEDURE — 1159F MED LIST DOCD IN RCRD: CPT | Performed by: STUDENT IN AN ORGANIZED HEALTH CARE EDUCATION/TRAINING PROGRAM

## 2025-03-24 PROCEDURE — 99214 OFFICE O/P EST MOD 30 MIN: CPT | Performed by: STUDENT IN AN ORGANIZED HEALTH CARE EDUCATION/TRAINING PROGRAM

## 2025-03-24 PROCEDURE — 1125F AMNT PAIN NOTED PAIN PRSNT: CPT | Performed by: STUDENT IN AN ORGANIZED HEALTH CARE EDUCATION/TRAINING PROGRAM

## 2025-03-24 PROCEDURE — 1160F RVW MEDS BY RX/DR IN RCRD: CPT | Performed by: STUDENT IN AN ORGANIZED HEALTH CARE EDUCATION/TRAINING PROGRAM

## 2025-03-24 RX ORDER — PRENATAL VIT/IRON FUM/FOLIC AC 27MG-0.8MG
1 TABLET ORAL DAILY
Qty: 90 TABLET | Refills: 1 | Status: SHIPPED | OUTPATIENT
Start: 2025-03-24

## 2025-03-24 RX ORDER — SUMATRIPTAN 50 MG/1
50 TABLET, FILM COATED ORAL AS NEEDED
Qty: 10 TABLET | Refills: 5 | Status: SHIPPED | OUTPATIENT
Start: 2025-03-24

## 2025-03-24 RX ORDER — TOPIRAMATE 25 MG/1
25 TABLET, FILM COATED ORAL 2 TIMES DAILY
Qty: 60 TABLET | Refills: 3 | Status: SHIPPED | OUTPATIENT
Start: 2025-03-24

## 2025-03-24 RX ORDER — CETIRIZINE HYDROCHLORIDE 10 MG/1
10 TABLET ORAL DAILY
Qty: 90 TABLET | Refills: 3 | Status: SHIPPED | OUTPATIENT
Start: 2025-03-24

## 2025-03-24 RX ORDER — FLUTICASONE PROPIONATE 50 MCG
2 SPRAY, SUSPENSION (ML) NASAL DAILY
Qty: 16 G | Refills: 11 | Status: SHIPPED | OUTPATIENT
Start: 2025-03-24

## 2025-03-24 RX ORDER — ALBUTEROL SULFATE 90 UG/1
2 INHALANT RESPIRATORY (INHALATION) EVERY 4 HOURS PRN
Qty: 18 G | Refills: 5 | Status: SHIPPED | OUTPATIENT
Start: 2025-03-24

## 2025-03-24 RX ORDER — BUDESONIDE AND FORMOTEROL FUMARATE DIHYDRATE 80; 4.5 UG/1; UG/1
2 AEROSOL RESPIRATORY (INHALATION)
Qty: 10.2 G | Refills: 5 | Status: SHIPPED | OUTPATIENT
Start: 2025-03-24

## 2025-03-24 RX ORDER — ELECTROLYTES/DEXTROSE
1 SOLUTION, ORAL ORAL DAILY
Qty: 90 TABLET | Refills: 3 | Status: SHIPPED | OUTPATIENT
Start: 2025-03-24 | End: 2025-03-24

## 2025-03-24 NOTE — PROGRESS NOTES
"Chief Complaint  Neck Pain (3 month follow up )    Subjective        Kelly Stevens presents to [facility]  History of Present Illness  29-year-old white female here for chronic disease management follow-up visit.    Patient states she is status post neurosurgery and was started on Robaxin as needed for low back pain and has been doing better.  I have counseled patient to avoid driving or operating machinery after taking muscle relaxants due to the drowsiness the cause.  Patient also status post physical therapy for back pain.    Pt states her heart rate increased constantly while on amitriptyline so had to stop the med.  Patient had already stopped amitriptyline and instructed patient to completely stop amitriptyline and not take it in the future.  Discussed alternative Topamax for migraine prophylaxis, patient agreeable to initiating Topamax.    Pt reports back pain under control on stretches recommended by PT and on robaxin Rx from neurosurgery.    Instructed patient to get the adult preventive immunization that are due at  the pharmacy, including -Tdap, flu, COVID booster.    Neck Pain         Objective   Vital Signs:  /78 (BP Location: Left arm, Patient Position: Sitting, Cuff Size: Adult)   Pulse 78   Temp 98 °F (36.7 °C) (Infrared)   Ht 165 cm (64.96\")   Wt 86.5 kg (190 lb 12.8 oz)   SpO2 97%   BMI 31.79 kg/m²   Estimated body mass index is 31.79 kg/m² as calculated from the following:    Height as of this encounter: 165 cm (64.96\").    Weight as of this encounter: 86.5 kg (190 lb 12.8 oz).               Physical Exam  Constitutional:       Appearance: Normal appearance.   HENT:      Head: Normocephalic and atraumatic.   Eyes:      Conjunctiva/sclera: Conjunctivae normal.   Cardiovascular:      Rate and Rhythm: Normal rate and regular rhythm.      Heart sounds: Normal heart sounds.   Pulmonary:      Effort: Pulmonary effort is normal.      Breath sounds: Normal " breath sounds.   Abdominal:      General: Bowel sounds are normal.      Palpations: Abdomen is soft.      Comments: Non-tender   Skin:     General: Skin is warm.   Neurological:      General: No focal deficit present.      Mental Status: She is alert and oriented to person, place, and time.   Psychiatric:         Mood and Affect: Mood normal.         Behavior: Behavior normal.        Result Review :    The following data was reviewed by: MICHELLE Grant on 03/24/2025:  CMP          4/16/2024    09:22 10/16/2024    14:26   CMP   Glucose 83  82    BUN 15  10    Creatinine 0.79  0.73    EGFR 104.0  114    Sodium 138  141    Potassium 4.1  4.6    Chloride 101  102    Calcium 9.7  9.3    Total Protein 6.8  6.7    Albumin 4.5  4.4    Globulin 2.3  2.3    Total Bilirubin <0.2  0.3    Alkaline Phosphatase 135  103    AST (SGOT) 63  25    ALT (SGPT) 94  23    Albumin/Globulin Ratio 2.0     BUN/Creatinine Ratio 19.0  14      CBC          8/9/2024    14:27   CBC   WBC 9.15       RBC 4.78       Hemoglobin 13.1       Hematocrit 40.0       MCV 83.7       MCH 27.4       MCHC 32.8       RDW 13.0       Platelets 330          Details          This result is from an external source.             Lipid Panel          4/16/2024    09:22 4/29/2024    09:03   Lipid Panel   Total Cholesterol 180     Triglycerides 45  55    HDL Cholesterol 94     VLDL Cholesterol 9     LDL Cholesterol  77       TSH          4/16/2024    09:22   TSH   TSH 0.802            Data reviewed : Consultant notes reviewed neurology consult note with Reyna Moore from January 2025 patient was started on amitriptyline and Zofran as needed for nausea related to migraine headaches.  Also reviewed neurosurgery consult note from February 2025 patient was started on Robaxin.             Assessment and Plan     Diagnoses and all orders for this visit:    1. Mild intermittent asthma without complication (Primary)  -     budesonide-formoterol (SYMBICORT) 80-4.5  MCG/ACT inhaler; Inhale 2 puffs 2 (Two) Times a Day. Please rinse mouth with water after each use.  Dispense: 10.2 g; Refill: 5  -     albuterol sulfate HFA (Ventolin HFA) 108 (90 Base) MCG/ACT inhaler; Inhale 2 puffs Every 4 (Four) Hours As Needed for Wheezing.  Dispense: 18 g; Refill: 5    2. Migraine without aura and without status migrainosus, not intractable  -     SUMAtriptan (IMITREX) 50 MG tablet; Take 1 tablet by mouth As Needed for Migraine (migraine).  Dispense: 10 tablet; Refill: 5  -     topiramate (TOPAMAX) 25 MG tablet; Take 1 tablet by mouth 2 (Two) Times a Day. Start 1 pill once a day for 2 weeks. Then increase to 1 tab twice daily thereafter.  Dispense: 60 tablet; Refill: 3    3. Environmental and seasonal allergies  -     fluticasone (FLONASE) 50 MCG/ACT nasal spray; Administer 2 sprays into the nostril(s) as directed by provider Daily.  Dispense: 16 g; Refill: 11  -     cetirizine (zyrTEC) 10 MG tablet; Take 1 tablet by mouth Daily.  Dispense: 90 tablet; Refill: 3    4. Chronic bilateral low back pain without sciatica    Other orders  -     Discontinue: multivitamin with minerals (Multivitamin Adult) tablet tablet; Take 1 tablet by mouth Daily for 360 days.  Dispense: 90 tablet; Refill: 3  -     Prenatal Vit-Fe Fumarate-FA (prenatal vitamin 27-0.8) 27-0.8 MG tablet tablet; Take 1 tablet by mouth Daily.  Dispense: 90 tablet; Refill: 1      All chronic conditions have been addressed and treated by the practice or other specialists. Medications have been reconciled and refilled as appropriate. Reiterated compliance and timely follow up appointments. Side effects of all new and old medications reviewed with the patient and patient willing to accept all risks involved. Advised RTO if no improvement or worsening of symptoms or if any new complaints arise. Patient advised to follow up with clinic or call after diagnostic tests, if patient does not hear from office 3 days after the test completion.             Follow Up     Return in about 3 months (around 6/24/2025) for Next scheduled follow up.  Patient was given instructions and counseling regarding her condition or for health maintenance advice. Please see specific information pulled into the AVS if appropriate.

## 2025-06-24 ENCOUNTER — OFFICE VISIT (OUTPATIENT)
Dept: FAMILY MEDICINE CLINIC | Facility: CLINIC | Age: 30
End: 2025-06-24
Payer: MEDICAID

## 2025-06-24 VITALS
DIASTOLIC BLOOD PRESSURE: 82 MMHG | HEART RATE: 72 BPM | WEIGHT: 182.4 LBS | SYSTOLIC BLOOD PRESSURE: 126 MMHG | TEMPERATURE: 98.3 F | HEIGHT: 65 IN | BODY MASS INDEX: 30.39 KG/M2 | OXYGEN SATURATION: 98 %

## 2025-06-24 DIAGNOSIS — Z13.220 LIPID SCREENING: ICD-10-CM

## 2025-06-24 DIAGNOSIS — J30.89 ENVIRONMENTAL AND SEASONAL ALLERGIES: ICD-10-CM

## 2025-06-24 DIAGNOSIS — M47.816 FACET ARTHRITIS OF LUMBAR REGION: Chronic | ICD-10-CM

## 2025-06-24 DIAGNOSIS — G43.009 MIGRAINE WITHOUT AURA AND WITHOUT STATUS MIGRAINOSUS, NOT INTRACTABLE: Chronic | ICD-10-CM

## 2025-06-24 DIAGNOSIS — Z13.1 DIABETES MELLITUS SCREENING: ICD-10-CM

## 2025-06-24 DIAGNOSIS — G43.009 MIGRAINE WITHOUT AURA AND WITHOUT STATUS MIGRAINOSUS, NOT INTRACTABLE: ICD-10-CM

## 2025-06-24 DIAGNOSIS — M47.812 FACET ARTHRITIS OF CERVICAL REGION: Chronic | ICD-10-CM

## 2025-06-24 DIAGNOSIS — M54.50 CHRONIC MIDLINE LOW BACK PAIN WITHOUT SCIATICA: Chronic | ICD-10-CM

## 2025-06-24 DIAGNOSIS — G89.29 CHRONIC NECK PAIN: Chronic | ICD-10-CM

## 2025-06-24 DIAGNOSIS — E01.0 THYROMEGALY: ICD-10-CM

## 2025-06-24 DIAGNOSIS — M54.2 NECK PAIN: ICD-10-CM

## 2025-06-24 DIAGNOSIS — R41.3 MEMORY CHANGES: ICD-10-CM

## 2025-06-24 DIAGNOSIS — M54.2 CHRONIC NECK PAIN: Chronic | ICD-10-CM

## 2025-06-24 DIAGNOSIS — G89.29 CHRONIC BILATERAL LOW BACK PAIN WITHOUT SCIATICA: Primary | Chronic | ICD-10-CM

## 2025-06-24 DIAGNOSIS — M46.1 SACROILIITIS: ICD-10-CM

## 2025-06-24 DIAGNOSIS — J45.20 MILD INTERMITTENT ASTHMA WITHOUT COMPLICATION: ICD-10-CM

## 2025-06-24 DIAGNOSIS — G89.29 CHRONIC MIDLINE LOW BACK PAIN WITHOUT SCIATICA: Chronic | ICD-10-CM

## 2025-06-24 DIAGNOSIS — M54.50 CHRONIC BILATERAL LOW BACK PAIN WITHOUT SCIATICA: Primary | Chronic | ICD-10-CM

## 2025-06-24 PROCEDURE — 1125F AMNT PAIN NOTED PAIN PRSNT: CPT | Performed by: STUDENT IN AN ORGANIZED HEALTH CARE EDUCATION/TRAINING PROGRAM

## 2025-06-24 PROCEDURE — 99214 OFFICE O/P EST MOD 30 MIN: CPT | Performed by: STUDENT IN AN ORGANIZED HEALTH CARE EDUCATION/TRAINING PROGRAM

## 2025-06-24 RX ORDER — FLUTICASONE PROPIONATE 50 MCG
2 SPRAY, SUSPENSION (ML) NASAL DAILY
Qty: 16 G | Refills: 11 | Status: SHIPPED | OUTPATIENT
Start: 2025-06-24

## 2025-06-24 RX ORDER — PRENATAL VIT/IRON FUM/FOLIC AC 27MG-0.8MG
1 TABLET ORAL DAILY
Qty: 90 TABLET | Refills: 1 | Status: SHIPPED | OUTPATIENT
Start: 2025-06-24

## 2025-06-24 RX ORDER — CETIRIZINE HYDROCHLORIDE 10 MG/1
10 TABLET ORAL DAILY
Qty: 90 TABLET | Refills: 3 | Status: SHIPPED | OUTPATIENT
Start: 2025-06-24

## 2025-06-24 RX ORDER — ACETAMINOPHEN 500 MG
500 TABLET ORAL EVERY 8 HOURS PRN
Qty: 100 TABLET | Refills: 2 | Status: SHIPPED | OUTPATIENT
Start: 2025-06-24

## 2025-06-24 RX ORDER — SUMATRIPTAN 50 MG/1
50 TABLET, FILM COATED ORAL AS NEEDED
Qty: 10 TABLET | Refills: 5 | Status: SHIPPED | OUTPATIENT
Start: 2025-06-24

## 2025-06-24 RX ORDER — ATOGEPANT 60 MG/1
60 TABLET ORAL DAILY
Qty: 30 TABLET | Refills: 5 | Status: SHIPPED | OUTPATIENT
Start: 2025-06-24

## 2025-06-24 RX ORDER — ALBUTEROL SULFATE 90 UG/1
2 INHALANT RESPIRATORY (INHALATION) EVERY 4 HOURS PRN
Qty: 18 G | Refills: 5 | Status: SHIPPED | OUTPATIENT
Start: 2025-06-24

## 2025-06-24 RX ORDER — METHOCARBAMOL 500 MG/1
500 TABLET, FILM COATED ORAL NIGHTLY PRN
Qty: 90 TABLET | Refills: 1 | Status: SHIPPED | OUTPATIENT
Start: 2025-06-24

## 2025-06-24 NOTE — PROGRESS NOTES
"Chief Complaint  office visit  (3 mon follow up /No c/c /Everything the same )    Subjective        Kelly Stevens presents to [facility]  History of Present Illness  29-year-old white female here for chronic disease management follow-up visit.  Patient reports she has been doing well in general and does not have any new concerns or complaints.  Patient also following neurology for management of chronic headaches.    Pt unable to take amitriptyline due to tachycardia and palpitations.  Pt states topamax did not work after she took it for couple months.   Discussed quilipta Rx, Patient voiced understanding.    Instructed patient to get the adult preventive immunization that are due at  the pharmacy, including -Tdap and COVID booster.          Objective   Vital Signs:  /82 (BP Location: Left arm, Patient Position: Sitting, Cuff Size: Adult)   Pulse 72   Temp 98.3 °F (36.8 °C)   Ht 165 cm (64.96\")   Wt 82.7 kg (182 lb 6.4 oz)   SpO2 98%   BMI 30.39 kg/m²   Estimated body mass index is 30.39 kg/m² as calculated from the following:    Height as of this encounter: 165 cm (64.96\").    Weight as of this encounter: 82.7 kg (182 lb 6.4 oz).               Physical Exam  Constitutional:       Appearance: Normal appearance.   HENT:      Head: Normocephalic and atraumatic.   Eyes:      Conjunctiva/sclera: Conjunctivae normal.   Neck:      Comments: Mild thyromegaly.  Cardiovascular:      Rate and Rhythm: Normal rate and regular rhythm.      Heart sounds: Normal heart sounds.   Pulmonary:      Effort: Pulmonary effort is normal.      Breath sounds: Normal breath sounds.   Abdominal:      General: Bowel sounds are normal.      Palpations: Abdomen is soft.      Comments: Non-tender   Skin:     General: Skin is warm.   Neurological:      General: No focal deficit present.      Mental Status: She is alert and oriented to person, place, and time.   Psychiatric:         Mood and Affect: Mood " normal.         Behavior: Behavior normal.        Result Review :    The following data was reviewed by: MICHELLE Grant on 06/24/2025:  CMP          10/16/2024    14:26   CMP   Glucose 82    BUN 10    Creatinine 0.73    EGFR 114    Sodium 141    Potassium 4.6    Chloride 102    Calcium 9.3    Total Protein 6.7    Albumin 4.4    Globulin 2.3    Total Bilirubin 0.3    Alkaline Phosphatase 103    AST (SGOT) 25    ALT (SGPT) 23    BUN/Creatinine Ratio 14      CBC          8/9/2024    14:27   CBC   WBC 9.15       RBC 4.78       Hemoglobin 13.1       Hematocrit 40.0       MCV 83.7       MCH 27.4       MCHC 32.8       RDW 13.0       Platelets 330          Details          This result is from an external source.                       Data reviewed : Consultant notes reviewed recent immediate care notes patient was treated for pharyngitis and conjunctivitis.             Assessment and Plan     Diagnoses and all orders for this visit:    1. Chronic bilateral low back pain without sciatica (Primary)  Assessment & Plan:  Discussed referral back to physical therapy, patient was understanding.    Orders:  -     Ambulatory Referral to Pain Management    2. Neck pain  Assessment & Plan:  Discussed referral back to physical therapy, patient was understanding.    Orders:  -     Ambulatory Referral to Pain Management    3. Chronic midline low back pain without sciatica  Assessment & Plan:  Discussed referral back to physical therapy, patient was understanding.    Orders:  -     Ambulatory Referral to Pain Management  -     acetaminophen (TYLENOL) 500 MG tablet; Take 1 tablet by mouth Every 8 (Eight) Hours As Needed for Mild Pain.  Dispense: 100 tablet; Refill: 2    4. Facet arthritis of cervical region  -     Ambulatory Referral to Pain Management    5. Facet arthritis of lumbar region  -     Ambulatory Referral to Pain Management    6. Sacroiliitis  -     Ambulatory Referral to Pain Management    7. Migraine without  aura and without status migrainosus, not intractable  Assessment & Plan:  Patient failed both amitriptyline due to side effects and Topamax due to lack of efficacy.  Started Qulipta we will wait for insurance approval.            Orders:  -     Atogepant (Qulipta) 60 MG tablet; Take 1 tablet by mouth Daily.  Dispense: 30 tablet; Refill: 5  -     CBC & Differential; Future  -     Comprehensive Metabolic Panel; Future  -     SUMAtriptan (IMITREX) 50 MG tablet; Take 1 tablet by mouth As Needed for Migraine (migraine).  Dispense: 10 tablet; Refill: 5    8. Thyromegaly  -     TSH; Future  -     T4, free; Future  -     US Thyroid    9. Mild intermittent asthma without complication  Assessment & Plan:  Stable and under control.          Orders:  -     albuterol sulfate HFA (Ventolin HFA) 108 (90 Base) MCG/ACT inhaler; Inhale 2 puffs Every 4 (Four) Hours As Needed for Wheezing.  Dispense: 18 g; Refill: 5  -     mometasone-formoterol (DULERA 100) 100-5 MCG/ACT inhaler; Inhale 2 puffs 2 (Two) Times a Day.  Dispense: 1 each; Refill: 11    10. Lipid screening  -     Lipid Panel; Future    11. Memory changes  Assessment & Plan:  Patient following neurology reported possibly related to remote head trauma.    Orders:  -     Vitamin B12; Future    12. Diabetes mellitus screening  -     Hemoglobin A1c; Future    13. Environmental and seasonal allergies  Assessment & Plan:  Stable    Orders:  -     cetirizine (zyrTEC) 10 MG tablet; Take 1 tablet by mouth Daily.  Dispense: 90 tablet; Refill: 3  -     fluticasone (FLONASE) 50 MCG/ACT nasal spray; Administer 2 sprays into the nostril(s) as directed by provider Daily.  Dispense: 16 g; Refill: 11    14. Chronic neck pain  -     acetaminophen (TYLENOL) 500 MG tablet; Take 1 tablet by mouth Every 8 (Eight) Hours As Needed for Mild Pain.  Dispense: 100 tablet; Refill: 2    Other orders  -     methocarbamol (ROBAXIN) 500 MG tablet; Take 1 tablet by mouth At Night As Needed for Muscle Spasms.  Please do not drive or operate heavy machinery after taking the medication, as the medication may cause drowsiness.  Dispense: 90 tablet; Refill: 1  -     Prenatal Vit-Fe Fumarate-FA (prenatal vitamin 27-0.8) 27-0.8 MG tablet tablet; Take 1 tablet by mouth Daily.  Dispense: 90 tablet; Refill: 1      All chronic conditions have been addressed and treated by the practice or other specialists. Medications have been reconciled and refilled as appropriate. Reiterated compliance and timely follow up appointments. Side effects of all new and old medications reviewed with the patient and patient willing to accept all risks involved. Advised RTO if no improvement or worsening of symptoms or if any new complaints arise. Patient advised to follow up with clinic or call after diagnostic tests, if patient does not hear from office 3 days after the test completion.            Follow Up     Return in about 3 months (around 9/24/2025) for Next scheduled follow up.  Patient was given instructions and counseling regarding her condition or for health maintenance advice. Please see specific information pulled into the AVS if appropriate.

## 2025-06-25 LAB
ALBUMIN SERPL-MCNC: 4.2 G/DL (ref 3.5–5.2)
ALBUMIN/GLOB SERPL: 1.8 G/DL
ALP SERPL-CCNC: 76 U/L (ref 39–117)
ALT SERPL-CCNC: 13 U/L (ref 1–33)
AST SERPL-CCNC: 14 U/L (ref 1–32)
BASOPHILS # BLD AUTO: 0.09 10*3/MM3 (ref 0–0.2)
BASOPHILS NFR BLD AUTO: 1.1 % (ref 0–1.5)
BILIRUB SERPL-MCNC: 0.6 MG/DL (ref 0–1.2)
BUN SERPL-MCNC: 11 MG/DL (ref 6–20)
BUN/CREAT SERPL: 15.3 (ref 7–25)
CALCIUM SERPL-MCNC: 8.9 MG/DL (ref 8.6–10.5)
CHLORIDE SERPL-SCNC: 100 MMOL/L (ref 98–107)
CHOLEST SERPL-MCNC: 149 MG/DL (ref 0–200)
CO2 SERPL-SCNC: 28.1 MMOL/L (ref 22–29)
CREAT SERPL-MCNC: 0.72 MG/DL (ref 0.57–1)
EGFRCR SERPLBLD CKD-EPI 2021: 115.5 ML/MIN/1.73
EOSINOPHIL # BLD AUTO: 0.27 10*3/MM3 (ref 0–0.4)
EOSINOPHIL NFR BLD AUTO: 3.2 % (ref 0.3–6.2)
ERYTHROCYTE [DISTWIDTH] IN BLOOD BY AUTOMATED COUNT: 12.2 % (ref 12.3–15.4)
GLOBULIN SER CALC-MCNC: 2.3 GM/DL
GLUCOSE SERPL-MCNC: 89 MG/DL (ref 65–99)
HBA1C MFR BLD: 5.3 % (ref 4.8–5.6)
HCT VFR BLD AUTO: 38 % (ref 34–46.6)
HDLC SERPL-MCNC: 64 MG/DL (ref 40–60)
HGB BLD-MCNC: 12.7 G/DL (ref 12–15.9)
IMM GRANULOCYTES # BLD AUTO: 0.04 10*3/MM3 (ref 0–0.05)
IMM GRANULOCYTES NFR BLD AUTO: 0.5 % (ref 0–0.5)
LDLC SERPL CALC-MCNC: 73 MG/DL (ref 0–100)
LYMPHOCYTES # BLD AUTO: 2.63 10*3/MM3 (ref 0.7–3.1)
LYMPHOCYTES NFR BLD AUTO: 30.8 % (ref 19.6–45.3)
MCH RBC QN AUTO: 28.8 PG (ref 26.6–33)
MCHC RBC AUTO-ENTMCNC: 33.4 G/DL (ref 31.5–35.7)
MCV RBC AUTO: 86.2 FL (ref 79–97)
MONOCYTES # BLD AUTO: 0.4 10*3/MM3 (ref 0.1–0.9)
MONOCYTES NFR BLD AUTO: 4.7 % (ref 5–12)
NEUTROPHILS # BLD AUTO: 5.1 10*3/MM3 (ref 1.7–7)
NEUTROPHILS NFR BLD AUTO: 59.7 % (ref 42.7–76)
NRBC BLD AUTO-RTO: 0 /100 WBC (ref 0–0.2)
PLATELET # BLD AUTO: 311 10*3/MM3 (ref 140–450)
POTASSIUM SERPL-SCNC: 3.3 MMOL/L (ref 3.5–5.2)
PROT SERPL-MCNC: 6.5 G/DL (ref 6–8.5)
RBC # BLD AUTO: 4.41 10*6/MM3 (ref 3.77–5.28)
SODIUM SERPL-SCNC: 137 MMOL/L (ref 136–145)
T4 FREE SERPL-MCNC: 1.19 NG/DL (ref 0.92–1.68)
TRIGL SERPL-MCNC: 61 MG/DL (ref 0–150)
TSH SERPL DL<=0.005 MIU/L-ACNC: 1.33 UIU/ML (ref 0.27–4.2)
VIT B12 SERPL-MCNC: 515 PG/ML (ref 211–946)
VLDLC SERPL CALC-MCNC: 12 MG/DL (ref 5–40)
WBC # BLD AUTO: 8.53 10*3/MM3 (ref 3.4–10.8)

## 2025-06-25 NOTE — ASSESSMENT & PLAN NOTE
Patient failed both amitriptyline due to side effects and Topamax due to lack of efficacy.  Started Qulipta we will wait for insurance approval.

## 2025-06-26 ENCOUNTER — RESULTS FOLLOW-UP (OUTPATIENT)
Dept: FAMILY MEDICINE CLINIC | Facility: CLINIC | Age: 30
End: 2025-06-26
Payer: MEDICAID

## 2025-06-26 DIAGNOSIS — E87.6 HYPOKALEMIA: Primary | ICD-10-CM

## 2025-07-01 ENCOUNTER — HOSPITAL ENCOUNTER (OUTPATIENT)
Dept: ULTRASOUND IMAGING | Facility: HOSPITAL | Age: 30
Discharge: HOME | End: 2025-07-01
Admitting: STUDENT IN AN ORGANIZED HEALTH CARE EDUCATION/TRAINING PROGRAM
Payer: MEDICAID

## 2025-07-01 DIAGNOSIS — E01.0 THYROMEGALY: ICD-10-CM

## 2025-07-01 PROCEDURE — 76536 US EXAM OF HEAD AND NECK: CPT
